# Patient Record
Sex: FEMALE | Race: WHITE | HISPANIC OR LATINO | Employment: UNEMPLOYED | URBAN - METROPOLITAN AREA
[De-identification: names, ages, dates, MRNs, and addresses within clinical notes are randomized per-mention and may not be internally consistent; named-entity substitution may affect disease eponyms.]

---

## 2018-05-16 ENCOUNTER — OFFICE VISIT (OUTPATIENT)
Dept: ORTHOPEDIC SURGERY | Facility: CLINIC | Age: 33
End: 2018-05-16

## 2018-05-16 VITALS
WEIGHT: 139.99 LBS | SYSTOLIC BLOOD PRESSURE: 129 MMHG | DIASTOLIC BLOOD PRESSURE: 87 MMHG | HEIGHT: 64 IN | BODY MASS INDEX: 23.9 KG/M2 | HEART RATE: 81 BPM

## 2018-05-16 DIAGNOSIS — R52 PAIN: Primary | ICD-10-CM

## 2018-05-16 DIAGNOSIS — M21.6X2 ACQUIRED METATARSUS ADDUCTUS OF LEFT FOOT: ICD-10-CM

## 2018-05-16 PROCEDURE — 99406 BEHAV CHNG SMOKING 3-10 MIN: CPT | Performed by: ORTHOPAEDIC SURGERY

## 2018-05-16 PROCEDURE — 99204 OFFICE O/P NEW MOD 45 MIN: CPT | Performed by: ORTHOPAEDIC SURGERY

## 2018-05-16 NOTE — PROGRESS NOTES
NEW PATIENT    Patient: Isabel Phillips  : 1985    Primary Care Provider: MARCELO Macdonald    Requesting Provider: As above    Pain of the Left Foot      History    Chief Complaint: left bunion pain    History of Present Illness: this is a very pleasant 32 year old woman with a greater than 1 year history of painful left bunion.  She reports it hurts in all shoes, is worse with increased activity, throbs at night with pressure.  She rates it as 5/10 all the time.  She has tried wider shoes, etc.  She is interested in surgery.  She does not have a family history of bunions.  She used to wear heels but has stopped due to pain. She reports it hurts in any shoe. No history of ligamentous laxity.  She smoked a few cigarettes a month- I advised her that she will have to quit completely to have foot surgery.  I explained the risks of smoking, including hardening of the arteries, gangrene, amputation.  I explained smoking poisons bone cells and increases the risk of nonunion of fractures and fusions.  I explained that studies show that smokers have FOUR times the risk of the general population when they have foot or ankle surgery.  (5min)      No current outpatient prescriptions on file prior to visit.     No current facility-administered medications on file prior to visit.       No Known Allergies   History reviewed. No pertinent past medical history.  No past surgical history on file.  Family History   Problem Relation Age of Onset   • Stroke Mother    • Arthritis Father       Social History     Social History   • Marital status:      Spouse name: N/A   • Number of children: N/A   • Years of education: N/A     Occupational History   • Not on file.     Social History Main Topics   • Smoking status: Former Smoker     Types: Cigarettes   • Smokeless tobacco: Never Used   • Alcohol use 0.6 oz/week     1 Glasses of wine per week   • Drug use: No   • Sexual activity: Defer     Other Topics Concern   •  "Not on file     Social History Narrative   • No narrative on file        Review of Systems   Constitutional: Negative.    HENT: Negative.    Eyes: Negative.    Respiratory: Negative.    Cardiovascular: Negative.    Gastrointestinal: Negative.    Endocrine: Negative.    Genitourinary: Negative.    Musculoskeletal: Positive for arthralgias.   Skin: Negative.    Allergic/Immunologic: Negative.    Neurological: Negative.    Hematological: Negative.    Psychiatric/Behavioral: Negative.        The following portions of the patient's history were reviewed and updated as appropriate: allergies, current medications, past family history, past medical history, past social history, past surgical history and problem list.    Physical Exam:   /87   Pulse 81   Ht 163.5 cm (64.37\")   Wt 63.5 kg (139 lb 15.9 oz)   BMI 23.75 kg/m²   GENERAL: Body habitus: normal weight for height    Lower extremity edema: Left: none; Right: none    Varicose veins:  Left: none; Right: none    Gait: normal     Mental Status:  awake and alert; oriented to person, place, and time    Voice:  clear  SKIN:  Normal and warm and dry    Hair Growth:  Right:normal; Left:  normal  NAILS: Toenails: normal  HEENT: Head: Normocephalic, atraumatic,  without obvious abnormality.  eye: normal external eye, no icterus  ears: normal external ears  nose: normal external nose  pharynx: dental hygiene adequate  PULM:  Repiratory effort normal  CV:  Dorsalis Pedis:  Right: 2+; Left:2+    Posterior Tibial: Right:2+; Left:2+    Capillary Refill:  Brisk  MSK:  Hand:right handed and sensation intact      Tibia:  Right:  non tender; Left:  non tender      Ankle:  Right: non tender, ROM  normal and symmetric and motor function  normal; Left:  non tender, ROM  normal and symmetric and motor function  normal      Foot:  Right:  no tendereness, only slight bunion, mild HV/MTPV; Left:  tender over the bunion, moderate/severe HV/MTPV, no pain with grind test, no laxity, " moderate HVIP, great toe rubs 2nd toe      NEURO: Heel Walking:  Right:  normal; Left:  normal    Toe Walking:  Right:  normal; Left:  normal     Leonia-Emily 5.07 monofilament test: normal    Lower extremity sensation: intact     Reflexes:  Biceps:  Right:  1+; Left:  1+           Quads:  Right:  2+; Left:  2+           Ankle:  Right:  1+; Left:  1+      Calf Atrophy:none    Motor Function: all 5/5         Medical Decision Making    Data Review:   ordered and reviewed x-rays today    Assessment and Plan/ Diagnosis/Treatment options:   1.Acquired metatarsus adductus of left foot  She has a painful left buion, with a fairly high IM angle.  (16+ deg)  Hallux valgus, metatarsus primus varus and painful bunion.  I explained the mechanical nature of the problem.  I explained that the bunion is not a growth on the foot but rather it is a widening of the angle between the first and second metatarsals.  I drew a picture on the foot and explained the problem in detail.  I explained that bunions are a result of heredity and shoe wear.  The reason they hurt is from shoes pushing on the bunion.      I explained that first line treatment is conservative.  Wide, round toed shoes  can help them be comfortable.  Sometimes custom orthotics can help if they also have metatarsalgia.  I explained that there is no brace that works to change the bunion angle.      If conservative treatment does not help the pain enough then the patient might consider surgery. Pain is the only indication for surgery.   I advised them that I never talk anyone into bunion surgery, only the patient can decide when or if the pain is enough to undergo surgery.      Bunion surgery helps pain.  The [employer] did a reasearch study a number of years ago and it showed that at ONE YEAR after surgery, 90% of patients were happy with the pain relief and the results.     It does not change the appearance of the foot very much, and  it does not change shoe size.  The toe is permanently somewhat stiffer after surgery.  The surgery has a long recovery time, and all foot and ankle surgery swells longer than any other type of surgery.  The reason the swelling is so prolonged is gravity- the foot is below the heart and thus it swells.  The swelling can last for months, sometimes a year.  Swelling will be more prolonged in a patient who has a condition like venous stasis or chronic edema.    She would like to proceed with surgery.  The patient has a  painful bunion with an intermetatarsal angle of greater than 15 degrees and moderate hallux valgus interphalangeus.  Pain is the only reason for the surgery.   Only the patient can decide when or if the pain is enough to undergo surgery.  They have done good conservative treatment, and feel that there is enough pain and limitation to undergo surgery.      Bunion surgery generally helps pain.  The [employer] did a research study a number of years ago and it showed that at ONE YEAR after surgery, 90% of patients were happy with the pain relief and the results.     Bunion surgery does not change the appearance of the foot very much, and it does not change shoe size.  The toe is permanently somewhat stiffer after surgery.  The surgery has a long recovery time, and all foot and ankle surgery swells longer than any other type of surgery.  The reason the swelling is so prolonged is gravity- the foot is below the heart and thus it swells.  The swelling can last for months, sometimes a year.     For bunions with an angle greater than 15 degrees, we cut the bones in two places.  The first bone cut is in the metatarsal and this is called a crescentic osteotomy.  The bone is cut, moved over toward the second metatarsal and held with two screws. The screws are generally permanent, only rarely do they cause   The second bone cut is in the bone of the big toe- a small wedge is removed  to straighten the toe, and it is held together internally with a wire loop.  This is the Akin osteotomy, the small wire loop is permanent.      Post op regimen: the first 6-8 weeks non weight bearing in a tall boot. No driving if it is the right foot.  Second 6-8 weeks walking in the tall boot.  After 12-16 weeks they generally can wear a wide sandal, or wide shoe.  It really takes a year to see the end results.  We discussed the risks including but not limited to death, infection, neurovascular damage, strokes, heart attacks, blood clots, chronic pain, deformity, malunion, nonunion, hardware failure, further surgery, hallux varus, stiffness,  amputation, etc.  Questions asked and answered in detail.

## 2018-05-29 ENCOUNTER — APPOINTMENT (OUTPATIENT)
Dept: PREADMISSION TESTING | Facility: HOSPITAL | Age: 33
End: 2018-05-29

## 2018-05-29 LAB
ANION GAP SERPL CALCULATED.3IONS-SCNC: 4 MMOL/L (ref 3–11)
BUN BLD-MCNC: 12 MG/DL (ref 9–23)
BUN/CREAT SERPL: 20 (ref 7–25)
CALCIUM SPEC-SCNC: 9.6 MG/DL (ref 8.7–10.4)
CHLORIDE SERPL-SCNC: 104 MMOL/L (ref 99–109)
CO2 SERPL-SCNC: 32 MMOL/L (ref 20–31)
CREAT BLD-MCNC: 0.6 MG/DL (ref 0.6–1.3)
DEPRECATED RDW RBC AUTO: 42.5 FL (ref 37–54)
ERYTHROCYTE [DISTWIDTH] IN BLOOD BY AUTOMATED COUNT: 13.6 % (ref 11.3–14.5)
GFR SERPL CREATININE-BSD FRML MDRD: 114 ML/MIN/1.73
GFR SERPL CREATININE-BSD FRML MDRD: 139 ML/MIN/1.73
GLUCOSE BLD-MCNC: 82 MG/DL (ref 70–100)
HBA1C MFR BLD: 5.7 % (ref 4.8–5.6)
HCT VFR BLD AUTO: 39.2 % (ref 34.5–44)
HGB BLD-MCNC: 13.2 G/DL (ref 11.5–15.5)
MCH RBC QN AUTO: 28.9 PG (ref 27–31)
MCHC RBC AUTO-ENTMCNC: 33.7 G/DL (ref 32–36)
MCV RBC AUTO: 86 FL (ref 80–99)
PLATELET # BLD AUTO: 259 10*3/MM3 (ref 150–450)
PMV BLD AUTO: 9.9 FL (ref 6–12)
POTASSIUM BLD-SCNC: 4.1 MMOL/L (ref 3.5–5.5)
RBC # BLD AUTO: 4.56 10*6/MM3 (ref 3.89–5.14)
SODIUM BLD-SCNC: 140 MMOL/L (ref 132–146)
WBC NRBC COR # BLD: 5.23 10*3/MM3 (ref 3.5–10.8)

## 2018-05-29 PROCEDURE — 36415 COLL VENOUS BLD VENIPUNCTURE: CPT

## 2018-05-29 PROCEDURE — 85027 COMPLETE CBC AUTOMATED: CPT | Performed by: ORTHOPAEDIC SURGERY

## 2018-05-29 PROCEDURE — 80048 BASIC METABOLIC PNL TOTAL CA: CPT | Performed by: ORTHOPAEDIC SURGERY

## 2018-05-29 PROCEDURE — 83036 HEMOGLOBIN GLYCOSYLATED A1C: CPT | Performed by: ORTHOPAEDIC SURGERY

## 2018-06-06 ENCOUNTER — TELEPHONE (OUTPATIENT)
Dept: ORTHOPEDIC SURGERY | Facility: CLINIC | Age: 33
End: 2018-06-06

## 2018-06-06 RX ORDER — ONDANSETRON 4 MG/1
4 TABLET, FILM COATED ORAL EVERY 6 HOURS PRN
Qty: 30 TABLET | Refills: 0 | Status: ON HOLD | OUTPATIENT
Start: 2018-06-06 | End: 2018-06-26

## 2018-06-06 RX ORDER — OXYCODONE HYDROCHLORIDE AND ACETAMINOPHEN 5; 325 MG/1; MG/1
1-2 TABLET ORAL EVERY 6 HOURS PRN
Qty: 60 TABLET | Refills: 0 | Status: ON HOLD | OUTPATIENT
Start: 2018-06-06 | End: 2018-06-26

## 2018-06-06 RX ORDER — HYDROCODONE BITARTRATE AND ACETAMINOPHEN 7.5; 325 MG/1; MG/1
1-2 TABLET ORAL EVERY 6 HOURS PRN
Qty: 60 TABLET | Refills: 0 | Status: ON HOLD | OUTPATIENT
Start: 2018-06-06 | End: 2018-06-26

## 2018-06-06 NOTE — TELEPHONE ENCOUNTER
CALLED PATIENT TO ADVISE OF ARRIVAL TIME OF 7:00AM FOR SURGERY ON 6/7 WITH DR CABRERA, NO ANSWER, LEFT VOICEMAIL.  ASKED PATIENT TO RETURN CALL CONFIRMING RECEIPT OF MESSAGE.

## 2018-06-07 ENCOUNTER — OUTSIDE FACILITY SERVICE (OUTPATIENT)
Dept: ORTHOPEDIC SURGERY | Facility: CLINIC | Age: 33
End: 2018-06-07

## 2018-06-07 PROCEDURE — 28295 COR HLX VLGS PRX MTAR OSTEOT: CPT | Performed by: ORTHOPAEDIC SURGERY

## 2018-06-07 PROCEDURE — 28295 COR HLX VLGS PRX MTAR OSTEOT: CPT | Performed by: PHYSICIAN ASSISTANT

## 2018-06-08 ENCOUNTER — TELEPHONE (OUTPATIENT)
Dept: ORTHOPEDIC SURGERY | Facility: CLINIC | Age: 33
End: 2018-06-08

## 2018-06-08 NOTE — TELEPHONE ENCOUNTER
He had some questions about her post op care.  Elevating, aspirin and swelling - I went over all the questions and told him to call me back if has any further ones. Britt

## 2018-06-12 ENCOUNTER — TELEPHONE (OUTPATIENT)
Dept: ORTHOPEDIC SURGERY | Facility: CLINIC | Age: 33
End: 2018-06-12

## 2018-06-12 NOTE — TELEPHONE ENCOUNTER
I called and spoke to her son Damian, I told him to have her elevate the foot as high as she can to get the swelling down and to make sure she takes her pain medication regularly today to get the pain under control.  He understood and will call with any other questions or concerns.  Diya

## 2018-06-12 NOTE — TELEPHONE ENCOUNTER
PTS SON, DAVID, CALLED ABOUT HER POST OP FOOT. HE SAID HER FOOT WAS NOT ELEVATED LAST NIGHT DUE TO IT FALLING OFF THE PILLOW AND SHE IS IN A LOT OF PAIN THIS MORNING. HE WOULD LIKE A CALL BACK REGARDING THIS. THE CALL BACK NUMBER -[phone].

## 2018-06-13 ENCOUNTER — OFFICE VISIT (OUTPATIENT)
Dept: ORTHOPEDIC SURGERY | Facility: CLINIC | Age: 33
End: 2018-06-13

## 2018-06-13 DIAGNOSIS — Z47.89 AFTERCARE FOLLOWING SURGERY OF THE MUSCULOSKELETAL SYSTEM: Primary | ICD-10-CM

## 2018-06-13 PROCEDURE — 29550 STRAPPING OF TOES: CPT | Performed by: ORTHOPAEDIC SURGERY

## 2018-06-13 PROCEDURE — 99024 POSTOP FOLLOW-UP VISIT: CPT | Performed by: ORTHOPAEDIC SURGERY

## 2018-06-13 NOTE — PROGRESS NOTES
Post-op (1 week status post -- left crescentic/aking 06/07/18)      Mayela D ArreolaReyes is 1 week status post left crescentic/ Shaji, 6/7/18. She reports no fever, chills.  She reports pain is well controlled.  They have been taking aspirin for DVT prophylaxis.  They have been NWB in boot.      No past surgical history on file.    There were no vitals taken for this visit.        No erythema, no drainage, no sign of infection, normal post op swelling.  Left foot has good alignment, incisions healing well    ordered and reviewed x-rays today    Assessment and Plan:   1. Aftercare following surgery of the musculoskeletal system  Doing well although she's had a lot of nausea.  I showed her how to work on great toe range of motion.  I put her back in a bunion strapping dressing.  She must remain nonweightbearing.  I will see her in a week for 2 views of the foot x-ray, at that visit please taken x-ray with the foot straight up and down rather than inverted if necessary do a simulated weightbearing AP- ask before you do the xray if needed    Using gauze, cling and ace wrap I placed a bunion strapping dressing to put the toe in appropriate alignment

## 2018-06-22 ENCOUNTER — OFFICE VISIT (OUTPATIENT)
Dept: ORTHOPEDIC SURGERY | Facility: CLINIC | Age: 33
End: 2018-06-22

## 2018-06-22 DIAGNOSIS — Z09 SURGERY FOLLOW-UP: Primary | ICD-10-CM

## 2018-06-22 PROCEDURE — 99024 POSTOP FOLLOW-UP VISIT: CPT | Performed by: ORTHOPAEDIC SURGERY

## 2018-06-22 NOTE — PROGRESS NOTES
Post-op (1 week f/u; 2 weeks status post Left Crescentic/Aking 06/07/18)      Mayela D ArreolaReyes is 2 weeks status post left crescentic osteotomy, 6/7/18. She reports no fever, chills.  She reports pain is well controlled.  They have been taking aspirin for DVT prophylaxis.  They have been NWB in boot.      No past surgical history on file.    There were no vitals taken for this visit.        No erythema, no drainage, no sign of infection, normal post op swelling.  Left foot clinically is well aligned    ordered and reviewed x-rays today    Assessment and Plan:   1. Surgery follow-up  Radiographs today show the osteotomy has not remained in place.  It has angled into overcorrection.  This is not where was in the operating room.  I was somewhat suspicious of the x-ray last week, but he was taken at an oblique angle, now that we got a straight on x-ray today the osteotomy is not in the same alignment it was.  We need to redo this.  I explained this to the patient, her son and her .  I cannot leave such a young person with this type of alignment.  I explained I might need to put more hardware in it.  We will do this on Tuesday.  I explained this is a rare problem, but it does sometimes happen.  Sometimes the screws just do not hold the bone in position.  We discussed the risks including but not limited to: death, infection, neurovascular damage, strokes, heart attacks, blood clots, chronic pain, deformity, stiffness, malunion, nonunion, hardware failure, need for further surgery,  amputation, etc.  Questions were asked and answered in detail.     - XR Foot 3+ View Left

## 2018-06-25 RX ORDER — ONDANSETRON 4 MG/1
4 TABLET, FILM COATED ORAL EVERY 6 HOURS PRN
Qty: 30 TABLET | Refills: 0 | Status: SHIPPED | OUTPATIENT
Start: 2018-06-25 | End: 2018-07-20

## 2018-06-25 RX ORDER — HYDROCODONE BITARTRATE AND ACETAMINOPHEN 7.5; 325 MG/1; MG/1
1-2 TABLET ORAL EVERY 6 HOURS PRN
Qty: 60 TABLET | Refills: 0 | Status: SHIPPED | OUTPATIENT
Start: 2018-06-25 | End: 2018-07-20

## 2018-06-26 ENCOUNTER — HOSPITAL ENCOUNTER (OUTPATIENT)
Facility: HOSPITAL | Age: 33
Setting detail: HOSPITAL OUTPATIENT SURGERY
Discharge: HOME | End: 2018-06-26
Attending: ORTHOPAEDIC SURGERY | Admitting: ORTHOPAEDIC SURGERY

## 2018-06-26 ENCOUNTER — ANESTHESIA EVENT (OUTPATIENT)
Dept: PERIOP | Facility: HOSPITAL | Age: 33
End: 2018-06-26

## 2018-06-26 ENCOUNTER — ANESTHESIA (OUTPATIENT)
Dept: PERIOP | Facility: HOSPITAL | Age: 33
End: 2018-06-26

## 2018-06-26 ENCOUNTER — APPOINTMENT (OUTPATIENT)
Dept: GENERAL RADIOLOGY | Facility: HOSPITAL | Age: 33
End: 2018-06-26

## 2018-06-26 VITALS
WEIGHT: 148 LBS | DIASTOLIC BLOOD PRESSURE: 84 MMHG | BODY MASS INDEX: 25.27 KG/M2 | SYSTOLIC BLOOD PRESSURE: 129 MMHG | HEIGHT: 64 IN | RESPIRATION RATE: 16 BRPM | TEMPERATURE: 97.7 F | OXYGEN SATURATION: 97 % | HEART RATE: 67 BPM

## 2018-06-26 DIAGNOSIS — Z09 SURGERY FOLLOW-UP: ICD-10-CM

## 2018-06-26 LAB
B-HCG UR QL: NEGATIVE
INTERNAL NEGATIVE CONTROL: NORMAL
INTERNAL POSITIVE CONTROL: REACTIVE
Lab: NORMAL

## 2018-06-26 PROCEDURE — 76000 FLUOROSCOPY <1 HR PHYS/QHP: CPT

## 2018-06-26 PROCEDURE — 25010000003 CEFAZOLIN IN DEXTROSE 2-4 GM/100ML-% SOLUTION: Performed by: ORTHOPAEDIC SURGERY

## 2018-06-26 PROCEDURE — C1713 ANCHOR/SCREW BN/BN,TIS/BN: HCPCS | Performed by: ORTHOPAEDIC SURGERY

## 2018-06-26 PROCEDURE — 25010000002 FENTANYL CITRATE (PF) 100 MCG/2ML SOLUTION: Performed by: NURSE ANESTHETIST, CERTIFIED REGISTERED

## 2018-06-26 PROCEDURE — 25010000002 MIDAZOLAM PER 1 MG: Performed by: NURSE ANESTHETIST, CERTIFIED REGISTERED

## 2018-06-26 PROCEDURE — 25010000002 DEXAMETHASONE PER 1 MG: Performed by: NURSE ANESTHETIST, CERTIFIED REGISTERED

## 2018-06-26 PROCEDURE — C1769 GUIDE WIRE: HCPCS | Performed by: ORTHOPAEDIC SURGERY

## 2018-06-26 PROCEDURE — 28322 REPAIR OF METATARSALS: CPT | Performed by: ORTHOPAEDIC SURGERY

## 2018-06-26 PROCEDURE — 25010000002 PROPOFOL 10 MG/ML EMULSION: Performed by: NURSE ANESTHETIST, CERTIFIED REGISTERED

## 2018-06-26 PROCEDURE — 25010000002 ONDANSETRON PER 1 MG: Performed by: NURSE ANESTHETIST, CERTIFIED REGISTERED

## 2018-06-26 PROCEDURE — 25010000002 ROPIVACAINE PER 1 MG: Performed by: ORTHOPAEDIC SURGERY

## 2018-06-26 DEVICE — SCRW CANN SHT THRD 1/3 4X28MM: Type: IMPLANTABLE DEVICE | Site: FOOT | Status: FUNCTIONAL

## 2018-06-26 RX ORDER — LIDOCAINE HYDROCHLORIDE 10 MG/ML
INJECTION, SOLUTION EPIDURAL; INFILTRATION; INTRACAUDAL; PERINEURAL AS NEEDED
Status: DISCONTINUED | OUTPATIENT
Start: 2018-06-26 | End: 2018-06-26 | Stop reason: SURG

## 2018-06-26 RX ORDER — SODIUM CHLORIDE 0.9 % (FLUSH) 0.9 %
1-10 SYRINGE (ML) INJECTION AS NEEDED
Status: DISCONTINUED | OUTPATIENT
Start: 2018-06-26 | End: 2018-06-26 | Stop reason: HOSPADM

## 2018-06-26 RX ORDER — PROMETHAZINE HYDROCHLORIDE 25 MG/ML
6.25 INJECTION, SOLUTION INTRAMUSCULAR; INTRAVENOUS ONCE AS NEEDED
Status: DISCONTINUED | OUTPATIENT
Start: 2018-06-26 | End: 2018-06-26 | Stop reason: HOSPADM

## 2018-06-26 RX ORDER — DEXAMETHASONE SODIUM PHOSPHATE 4 MG/ML
INJECTION, SOLUTION INTRA-ARTICULAR; INTRALESIONAL; INTRAMUSCULAR; INTRAVENOUS; SOFT TISSUE AS NEEDED
Status: DISCONTINUED | OUTPATIENT
Start: 2018-06-26 | End: 2018-06-26 | Stop reason: SURG

## 2018-06-26 RX ORDER — PROMETHAZINE HYDROCHLORIDE 25 MG/1
25 SUPPOSITORY RECTAL ONCE AS NEEDED
Status: DISCONTINUED | OUTPATIENT
Start: 2018-06-26 | End: 2018-06-26 | Stop reason: HOSPADM

## 2018-06-26 RX ORDER — PROPOFOL 10 MG/ML
VIAL (ML) INTRAVENOUS AS NEEDED
Status: DISCONTINUED | OUTPATIENT
Start: 2018-06-26 | End: 2018-06-26 | Stop reason: SURG

## 2018-06-26 RX ORDER — PROMETHAZINE HYDROCHLORIDE 25 MG/1
25 TABLET ORAL ONCE AS NEEDED
Status: DISCONTINUED | OUTPATIENT
Start: 2018-06-26 | End: 2018-06-26 | Stop reason: HOSPADM

## 2018-06-26 RX ORDER — ONDANSETRON 2 MG/ML
INJECTION INTRAMUSCULAR; INTRAVENOUS AS NEEDED
Status: DISCONTINUED | OUTPATIENT
Start: 2018-06-26 | End: 2018-06-26 | Stop reason: SURG

## 2018-06-26 RX ORDER — MIDAZOLAM HYDROCHLORIDE 1 MG/ML
INJECTION INTRAMUSCULAR; INTRAVENOUS AS NEEDED
Status: DISCONTINUED | OUTPATIENT
Start: 2018-06-26 | End: 2018-06-26 | Stop reason: SURG

## 2018-06-26 RX ORDER — FAMOTIDINE 20 MG/1
20 TABLET, FILM COATED ORAL
Status: DISCONTINUED | OUTPATIENT
Start: 2018-06-26 | End: 2018-06-26 | Stop reason: HOSPADM

## 2018-06-26 RX ORDER — SODIUM CHLORIDE, SODIUM LACTATE, POTASSIUM CHLORIDE, CALCIUM CHLORIDE 600; 310; 30; 20 MG/100ML; MG/100ML; MG/100ML; MG/100ML
9 INJECTION, SOLUTION INTRAVENOUS CONTINUOUS PRN
Status: DISCONTINUED | OUTPATIENT
Start: 2018-06-26 | End: 2018-06-26 | Stop reason: HOSPADM

## 2018-06-26 RX ORDER — FENTANYL CITRATE 50 UG/ML
50 INJECTION, SOLUTION INTRAMUSCULAR; INTRAVENOUS
Status: DISCONTINUED | OUTPATIENT
Start: 2018-06-26 | End: 2018-06-26 | Stop reason: HOSPADM

## 2018-06-26 RX ORDER — FENTANYL CITRATE 50 UG/ML
INJECTION, SOLUTION INTRAMUSCULAR; INTRAVENOUS AS NEEDED
Status: DISCONTINUED | OUTPATIENT
Start: 2018-06-26 | End: 2018-06-26 | Stop reason: SURG

## 2018-06-26 RX ORDER — LIDOCAINE HYDROCHLORIDE 10 MG/ML
0.5 INJECTION, SOLUTION EPIDURAL; INFILTRATION; INTRACAUDAL; PERINEURAL ONCE AS NEEDED
Status: COMPLETED | OUTPATIENT
Start: 2018-06-26 | End: 2018-06-26

## 2018-06-26 RX ORDER — CEFAZOLIN SODIUM 2 G/100ML
2 INJECTION, SOLUTION INTRAVENOUS ONCE
Status: COMPLETED | OUTPATIENT
Start: 2018-06-26 | End: 2018-06-26

## 2018-06-26 RX ADMIN — LIDOCAINE HYDROCHLORIDE 0.5 ML: 10 INJECTION, SOLUTION EPIDURAL; INFILTRATION; INTRACAUDAL; PERINEURAL at 12:03

## 2018-06-26 RX ADMIN — MIDAZOLAM HYDROCHLORIDE 2 MG: 1 INJECTION, SOLUTION INTRAMUSCULAR; INTRAVENOUS at 12:59

## 2018-06-26 RX ADMIN — LIDOCAINE HYDROCHLORIDE 50 MG: 10 INJECTION, SOLUTION EPIDURAL; INFILTRATION; INTRACAUDAL; PERINEURAL at 13:05

## 2018-06-26 RX ADMIN — CEFAZOLIN SODIUM 2 G: 2 INJECTION, SOLUTION INTRAVENOUS at 12:59

## 2018-06-26 RX ADMIN — PROPOFOL 200 MG: 10 INJECTION, EMULSION INTRAVENOUS at 13:05

## 2018-06-26 RX ADMIN — SODIUM CHLORIDE, POTASSIUM CHLORIDE, SODIUM LACTATE AND CALCIUM CHLORIDE 9 ML/HR: 600; 310; 30; 20 INJECTION, SOLUTION INTRAVENOUS at 12:03

## 2018-06-26 RX ADMIN — FAMOTIDINE 20 MG: 20 TABLET ORAL at 12:07

## 2018-06-26 RX ADMIN — ONDANSETRON 4 MG: 2 INJECTION INTRAMUSCULAR; INTRAVENOUS at 14:20

## 2018-06-26 RX ADMIN — FENTANYL CITRATE 100 MCG: 50 INJECTION, SOLUTION INTRAMUSCULAR; INTRAVENOUS at 13:05

## 2018-06-26 RX ADMIN — DEXAMETHASONE SODIUM PHOSPHATE 8 MG: 4 INJECTION, SOLUTION INTRAMUSCULAR; INTRAVENOUS at 13:15

## 2018-06-26 RX ADMIN — SODIUM CHLORIDE, POTASSIUM CHLORIDE, SODIUM LACTATE AND CALCIUM CHLORIDE: 600; 310; 30; 20 INJECTION, SOLUTION INTRAVENOUS at 12:58

## 2018-06-26 RX ADMIN — SODIUM CHLORIDE, POTASSIUM CHLORIDE, SODIUM LACTATE AND CALCIUM CHLORIDE: 600; 310; 30; 20 INJECTION, SOLUTION INTRAVENOUS at 14:07

## 2018-06-26 RX ADMIN — FENTANYL CITRATE 50 MCG: 50 INJECTION, SOLUTION INTRAMUSCULAR; INTRAVENOUS at 15:14

## 2018-06-26 RX ADMIN — EPHEDRINE SULFATE 5 MG: 50 INJECTION INTRAMUSCULAR; INTRAVENOUS; SUBCUTANEOUS at 13:58

## 2018-06-26 NOTE — ANESTHESIA PROCEDURE NOTES
Airway  Urgency: elective    Airway not difficult    General Information and Staff    Patient location during procedure: OR  CRNA: KG ASCENCIO    Indications and Patient Condition  Indications for airway management: airway protection    Preoxygenated: yes  Mask difficulty assessment: 1 - vent by mask    Final Airway Details  Final airway type: supraglottic airway      Successful airway: I-gel  Size 4    Number of attempts at approach: 1    Additional Comments  LMA placed without difficulty, ventilation with assist, equal breath sounds and symmetric chest rise and fall

## 2018-06-26 NOTE — ANESTHESIA POSTPROCEDURE EVALUATION
Patient: Mayela D ArreolaReyes    Procedure Summary     Date:  06/26/18 Room / Location:   [facility]  [facility]    Anesthesia Start:  1259 Anesthesia Stop:      Procedure:  REVISION OF OSTEOTOMY LEFT FOOT (Left Foot) Diagnosis:       Surgery follow-up      (Surgery follow-up [Z09])    Surgeon:  Albina Banuelos MD Provider:  Damien Florian MD    Anesthesia Type:  general ASA Status:  1          Anesthesia Type: general  Last vitals  BP   113/72   Temp   97.1   Pulse   77   Resp    12   SpO2   100%     Post Anesthesia Care and Evaluation    Patient location during evaluation: PACU  Patient participation: complete - patient participated  Level of consciousness: awake and alert  Pain score: 0  Pain management: adequate  Airway patency: patent  Anesthetic complications: No anesthetic complications  PONV Status: none  Cardiovascular status: hemodynamically stable and acceptable  Respiratory status: nonlabored ventilation, acceptable and nasal cannula  Hydration status: acceptable

## 2018-06-26 NOTE — ANESTHESIA PREPROCEDURE EVALUATION
Anesthesia Evaluation     Patient summary reviewed and Nursing notes reviewed   NPO Solid Status: > 8 hours  NPO Liquid Status: > 8 hours           Airway   Mallampati: I  TM distance: >3 FB  Neck ROM: full  No difficulty expected  Dental      Pulmonary - normal exam   Cardiovascular   Exercise tolerance: good (4-7 METS)    Rhythm: regular  Rate: normal        Neuro/Psych  GI/Hepatic/Renal/Endo      Musculoskeletal     Abdominal    Substance History      OB/GYN          Other                        Anesthesia Plan    ASA 1     general     intravenous induction   Anesthetic plan and risks discussed with patient.

## 2018-07-02 ENCOUNTER — OFFICE VISIT (OUTPATIENT)
Dept: ORTHOPEDIC SURGERY | Facility: CLINIC | Age: 33
End: 2018-07-02

## 2018-07-02 DIAGNOSIS — Z47.89 AFTERCARE FOLLOWING SURGERY OF THE MUSCULOSKELETAL SYSTEM: Primary | ICD-10-CM

## 2018-07-02 PROCEDURE — 99024 POSTOP FOLLOW-UP VISIT: CPT | Performed by: ORTHOPAEDIC SURGERY

## 2018-07-02 NOTE — PROGRESS NOTES
Post-op (1 week s/p REVISION OF OSTEOTOMY LEFT FOOT 6/26/18)      Mayela D ArreolaReyes is 1 week status post revision left crescentic, 6/26/18. She reports no fever, chills.  She reports pain is well controlled.  They have been taking aspirin for DVT prophylaxis.  They have been NWB in boot.      Past Surgical History:   Procedure Laterality Date   • FOOT OSTEOTOMY     • FOOT OSTEOTOMY Left 6/26/2018    Procedure: REVISION OF OSTEOTOMY LEFT FOOT;  Surgeon: Albina Banuelos MD;  [facility];  Service: Orthopedics       [facility] 06/15/2018         No erythema, no drainage, no sign of infection, normal post op swelling. Good alignment left foot, incisions healing well    ordered and reviewed x-rays today    Assessment and Plan:   1. Aftercare following surgery of the musculoskeletal system  The osteotomy is in much better alignment.  I replaced a bunion strapping dressing, pulling great toe laterally to avoid varus.  Continue non-wt bearing, return to see Ms Sifuentes on 7/10/18 for xray, and possible suture removal. She should be replaced in buion strapping dressing and see me for another xray in 7-10 days.      Using gauze, cling and ace wrap I placed a bunion strapping dressing to put the toe in appropriate alignment    - XR Foot 2 View Left

## 2018-07-10 ENCOUNTER — OFFICE VISIT (OUTPATIENT)
Dept: ORTHOPEDIC SURGERY | Facility: CLINIC | Age: 33
End: 2018-07-10

## 2018-07-10 DIAGNOSIS — Z47.89 AFTERCARE FOLLOWING SURGERY OF THE MUSCULOSKELETAL SYSTEM: Primary | ICD-10-CM

## 2018-07-10 PROCEDURE — 99024 POSTOP FOLLOW-UP VISIT: CPT | Performed by: PHYSICIAN ASSISTANT

## 2018-07-10 NOTE — PROGRESS NOTES
[facility] Orthopaedic Surgery Clinic Note    Subjective     Patient: Mayela D ArreolaReyes  : 1985    Primary Care Provider: MARCELO Macdonald    Requesting Provider: As above    Post-op (1 week f/u; 2 weeks status post Revision of Osteotomy Left Foot 18)      History    History of Present Illness: Patient returns status post revision left crescentic Shaji osteotomy 18.  She reports improved pain.  She reports no new symptoms.  She has been nonweightbearing as instructed.    Current Outpatient Prescriptions on File Prior to Visit   Medication Sig Dispense Refill   • HYDROcodone-acetaminophen (NORCO) 7.5-325 MG per tablet Take 1-2 tablets by mouth Every 6 (Six) Hours As Needed for Moderate Pain  (as needed for post surgical pain). 60 tablet 0   • ondansetron (ZOFRAN) 4 MG tablet Take 1 tablet by mouth Every 6 (Six) Hours As Needed for Nausea or Vomiting. 30 tablet 0     No current facility-administered medications on file prior to visit.       No Known Allergies   History reviewed. No pertinent past medical history.  Past Surgical History:   Procedure Laterality Date   • FOOT OSTEOTOMY     • FOOT OSTEOTOMY Left 2018    Procedure: REVISION OF OSTEOTOMY LEFT FOOT;  Surgeon: Albina Banuelos MD;  Location: [facility];  Service: Orthopedics     Family History   Problem Relation Age of Onset   • Stroke Mother    • Arthritis Father       Social History     Social History   • Marital status:      Spouse name: N/A   • Number of children: N/A   • Years of education: N/A     Occupational History   • Not on file.     Social History Main Topics   • Smoking status: Former Smoker     Types: Cigarettes   • Smokeless tobacco: Never Used   • Alcohol use 0.6 oz/week     1 Glasses of wine per week   • Drug use: No   • Sexual activity: Defer     Other Topics Concern   • Not on file     Social History Narrative   • No narrative on file        Review of Systems   Constitutional: Negative.    HENT:  Negative.    Eyes: Negative.    Respiratory: Negative.    Cardiovascular: Negative.    Gastrointestinal: Negative.    Endocrine: Negative.    Genitourinary: Negative.    Musculoskeletal: Positive for joint swelling.   Skin: Negative.    Allergic/Immunologic: Negative.    Neurological: Negative.    Hematological: Negative.    Psychiatric/Behavioral: Negative.        The following portions of the patient's history were reviewed and updated as appropriate: allergies, current medications, past family history, past medical history, past social history, past surgical history and problem list.      Objective      Physical Exam  LMP 06/15/2018     There is no height or weight on file to calculate BMI.    Ortho Exam  Left foot exam:  Incisions are healing well  Mild postop swelling as expected  Pulses 2+    Medical Decision Making    Data Review:   ordered and reviewed x-rays today    Assessment:  1. Aftercare following surgery of the musculoskeletal system        Plan:  Doing well status post revision left crescentic osteotomy 6/26/18.  X-rays today show good good alignment of the osteotomy with intact hardware.  Sutures removed today.  Patient was instructed to remain nonweightbearing.  She was redressed with the bunion strapping.  She will return in 7-10 days to see Dr. Kendall with repeat x-rays of the left foot nonweightbearing or sooner if needed.      Lita Sifuentes PA-C  07/10/18  10:11 AM

## 2018-07-16 ENCOUNTER — TELEPHONE (OUTPATIENT)
Dept: ORTHOPEDIC SURGERY | Facility: CLINIC | Age: 33
End: 2018-07-16

## 2018-07-16 NOTE — TELEPHONE ENCOUNTER
PT STATES THAT FOOT HAS BEEN REALLY HOT FOR THE PAST 3-4 DAYS. PT CANNOT TELL IF IT IS RED BECAUSE OF THE BOOT THAT'S ON. PLEASE CALL PT BACK.

## 2018-07-16 NOTE — TELEPHONE ENCOUNTER
I called her and she said it has a burning feeling. She is not running a fever.  It only happens every 2-3 hours and will last 10-15 minutes and then it is gone.  She has not really been elevating high enough.  She stated that the she also gets tingling and feels like there is a lot of pressure in the boot.  I explained she is probably having swelling in the boot. I recommended that she elevate the leg higher than her heart tonight and tomorrow and to see if that helps.  I told her to call me back on Wednesday if this does not help.  Diya

## 2018-07-17 NOTE — TELEPHONE ENCOUNTER
Let her a voicemail to let her know it is ok to not come in if she is better but to call us if she starts having problems again.  Diya

## 2018-07-17 NOTE — TELEPHONE ENCOUNTER
I called her and she said she has been doing what I suggested and is much better. Do you want her to still come in anyway??  Diya

## 2018-07-20 ENCOUNTER — OFFICE VISIT (OUTPATIENT)
Dept: ORTHOPEDIC SURGERY | Facility: CLINIC | Age: 33
End: 2018-07-20

## 2018-07-20 DIAGNOSIS — Z47.89 AFTERCARE FOLLOWING SURGERY OF THE MUSCULOSKELETAL SYSTEM: Primary | ICD-10-CM

## 2018-07-20 PROCEDURE — 99024 POSTOP FOLLOW-UP VISIT: CPT | Performed by: ORTHOPAEDIC SURGERY

## 2018-07-20 NOTE — PROGRESS NOTES
Post-op Follow-up (3 weeks status post Revision of Osteotomy Left Foot 6/26/18))      Mayela D ArreolaReyes is 3 weeks status post revision crescentic osteotomy, 6/26/18. She reports no fever, chills.  She reports pain is well controlled.  They have been taking aspirin for DVT prophylaxis.  They have been NWB in boot.      Past Surgical History:   Procedure Laterality Date   • FOOT OSTEOTOMY     • FOOT OSTEOTOMY Left 6/26/2018    Procedure: REVISION OF OSTEOTOMY LEFT FOOT;  Surgeon: Albina Banuelos MD;  Location: [facility];  Service: Orthopedics       There were no vitals taken for this visit.        No erythema, no drainage, no sign of infection, normal post op swelling. Good alignment left foot    ordered and reviewed x-rays today    Assessment and Plan:   1. Aftercare following surgery of the musculoskeletal system  Currently doing well, the osteotomy is staying in position.  No significant varus of the toe.  She may now get it wet.  Continue nonweightbearing.  I want her to work on range of motion of the toe and I showed her how to do this.  I want her to keep the first and second toes lucia taped to prevent varus.  I will see her in 3 weeks for 2 views of the foot to possibly allow her to begin weightbearing  - XR Foot 2 View Left; Future

## 2018-08-10 ENCOUNTER — OFFICE VISIT (OUTPATIENT)
Dept: ORTHOPEDIC SURGERY | Facility: CLINIC | Age: 33
End: 2018-08-10

## 2018-08-10 ENCOUNTER — TELEPHONE (OUTPATIENT)
Dept: ORTHOPEDIC SURGERY | Facility: CLINIC | Age: 33
End: 2018-08-10

## 2018-08-10 DIAGNOSIS — Z09 SURGICAL FOLLOW-UP CARE: Primary | ICD-10-CM

## 2018-08-10 PROCEDURE — 99024 POSTOP FOLLOW-UP VISIT: CPT | Performed by: ORTHOPAEDIC SURGERY

## 2018-08-10 NOTE — PROGRESS NOTES
Post-op (3 weeks - status post Revision of Osteotomy Left Foot 6/26/18)      Mayela D ArreolaReyes is 6 weeks status post revision left crescentic, 6/26/18. She reports no fever, chills.  She reports pain is well controlled.  They have been taking aspirin for DVT prophylaxis.  They have been NWB in boot.      Past Surgical History:   Procedure Laterality Date   • FOOT OSTEOTOMY     • FOOT OSTEOTOMY Left 6/26/2018    Procedure: REVISION OF OSTEOTOMY LEFT FOOT;  Surgeon: Albina Banuelos MD;  Location: [facility];  Service: Orthopedics       There were no vitals taken for this visit.        No erythema, no drainage, no sign of infection, normal post op swelling. Excellent alignment left foot, great toe has 20 deg dorsiflex, minimal plantar    ordered and reviewed x-rays today    Assessment and Plan:   1. Surgical follow-up care  Doing well, toe and osteotomy in good alignment.  She may walk in the boot now, work more on great toe ROM, I showed her how to do this.  I will see her in 4 weeks for xray.  She will be off work until 10/31 at least.   - XR Foot 2 View Left

## 2018-09-07 ENCOUNTER — OFFICE VISIT (OUTPATIENT)
Dept: ORTHOPEDIC SURGERY | Facility: CLINIC | Age: 33
End: 2018-09-07

## 2018-09-07 DIAGNOSIS — Z47.89 AFTERCARE FOLLOWING SURGERY OF THE MUSCULOSKELETAL SYSTEM: Primary | ICD-10-CM

## 2018-09-07 PROCEDURE — 99024 POSTOP FOLLOW-UP VISIT: CPT | Performed by: ORTHOPAEDIC SURGERY

## 2018-09-07 NOTE — PROGRESS NOTES
Post-op Follow-up (4 week f/u; 10 weeks - status post Revision of Osteotomy Left Foot 6/26/18)      Mayela D ArreolaReyes is 10 weeks status post right crescentic osteotomy revision 6/26/18. She reports no fever, chills.  She reports pain is well controlled.  They have been taking off meds now for DVT prophylaxis.  They have been weight bearing in boot.      Past Surgical History:   Procedure Laterality Date   • FOOT OSTEOTOMY     • FOOT OSTEOTOMY Left 6/26/2018    Procedure: REVISION OF OSTEOTOMY LEFT FOOT;  Surgeon: Albina Banuelos MD;  Location: [facility];  Service: Orthopedics       There were no vitals taken for this visit.        No erythema, no drainage, no sign of infection, normal post op swelling. Excellent alignment right foot, great toe has 20 deg dorsiflex and plantar flex, no tenderness    ordered and reviewed x-rays today    Assessment and Plan:   1. Aftercare following surgery of the musculoskeletal system  Doing well, she may go into a wide shoe or sandal, make sure it is not too tight for normal swelling. I will see her in 6 weeks fro xray of foot,   - XR Foot 2 View Left

## 2018-09-25 ENCOUNTER — TELEPHONE (OUTPATIENT)
Dept: ORTHOPEDIC SURGERY | Facility: CLINIC | Age: 33
End: 2018-09-25

## 2018-09-25 NOTE — TELEPHONE ENCOUNTER
PT WOULD LIKE A NOTE STATING WHEN SHE IS ABLE TO RETURN TO WORK. IF THIS IS OKAY, WHEN TYPED SHE WOULD LIKE IT FAXED [phone].

## 2018-10-19 ENCOUNTER — OFFICE VISIT (OUTPATIENT)
Dept: ORTHOPEDIC SURGERY | Facility: CLINIC | Age: 33
End: 2018-10-19

## 2018-10-19 VITALS — OXYGEN SATURATION: 98 % | BODY MASS INDEX: 24.59 KG/M2 | HEIGHT: 64 IN | HEART RATE: 96 BPM | WEIGHT: 144 LBS

## 2018-10-19 DIAGNOSIS — Z47.89 AFTERCARE FOLLOWING SURGERY OF THE MUSCULOSKELETAL SYSTEM: Primary | ICD-10-CM

## 2018-10-19 DIAGNOSIS — M21.6X2 ACQUIRED METATARSUS ADDUCTUS OF LEFT FOOT: ICD-10-CM

## 2018-10-19 PROCEDURE — 99212 OFFICE O/P EST SF 10 MIN: CPT | Performed by: ORTHOPAEDIC SURGERY

## 2018-10-19 NOTE — PROGRESS NOTES
"Post-op (6 week f/u; 16 weeks - status post Revision of Osteotomy Left Foot 6/26/18)      Mayela D ArreolaReyes is 3 months status post left crescentic Akin with revision, 6/26/18. She reports no fever, chills.  She reports pain is resolved.  They have been taking off meds now for DVT prophylaxis.  They have been weight bearing as tolerated.      Past Surgical History:   Procedure Laterality Date   • FOOT OSTEOTOMY     • FOOT OSTEOTOMY Left 6/26/2018    Procedure: REVISION OF OSTEOTOMY LEFT FOOT;  Surgeon: Albina Banuelos MD;  Location: Replaced by [facility];  Service: Orthopedics       Pulse 96   Ht 162.6 cm (64.02\")   Wt 65.3 kg (144 lb)   SpO2 98%   BMI 24.71 kg/m²         No erythema, no drainage, no sign of infection, normal post op swelling.  Still moderately stiff in the great toe, neutral plantarflexion, 30° dorsiflexion, excellent alignment    ordered and reviewed x-rays today    Assessment and Plan:   1. Aftercare following surgery of the musculoskeletal system  She has excellent alignment, after the revision.  Moderately stiff and she is having some trouble walking straight again, I think she would benefit from physical therapy.  She is to stay off work.  I'll see her in 3 months with final standing 2 views of the foot  - XR Foot 2 View Left          "

## 2018-11-07 ENCOUNTER — HOSPITAL ENCOUNTER (OUTPATIENT)
Dept: PHYSICAL THERAPY | Facility: HOSPITAL | Age: 33
Setting detail: THERAPIES SERIES
Discharge: HOME | End: 2018-11-07
Attending: ORTHOPAEDIC SURGERY

## 2018-11-07 DIAGNOSIS — Z98.890 S/P BUNIONECTOMY: Primary | ICD-10-CM

## 2018-11-07 PROCEDURE — 97161 PT EVAL LOW COMPLEX 20 MIN: CPT | Performed by: PHYSICAL THERAPIST

## 2018-11-07 PROCEDURE — 97110 THERAPEUTIC EXERCISES: CPT | Performed by: PHYSICAL THERAPIST

## 2018-11-07 NOTE — THERAPY EVALUATION
"    Outpatient Physical Therapy Ortho Initial Evaluation   [place]     Patient Name: Mayela D ArreolaReyes  : 1985  MRN: [id number]  Today's Date: 2018      Visit Date: 2018    Patient Active Problem List   Diagnosis   • Acquired metatarsus adductus of left foot   • Surgery follow-up        No past medical history on file.     Past Surgical History:   Procedure Laterality Date   • FOOT OSTEOTOMY     • FOOT OSTEOTOMY Left 2018    Procedure: REVISION OF OSTEOTOMY LEFT FOOT;  Surgeon: Albina Banuelos MD;  Location: [facility];  Service: Orthopedics       Visit Dx:     ICD-10-CM ICD-9-CM   1. S/P bunionectomy Z98.890 V45.89             Patient History     Row Name 18 1100             History    Chief Complaint Joint stiffness;Pain  -LS      Type of Pain Foot pain   L foot  -LS      Brief Description of Current Complaint Pt had a bunion removed from her L metatarsal head on  after experiencing severe foot pain for appx 1 month. Pt had a revision on  due to \"movement of a screw\" per pt report. Since surgery, pain has significantly decreased and her function has improved. Her surgeon provided exercises to restore mobility of the foot and ankle and pt was compliant. She had a f/u with her surgeon on  who referred her to PT to improve mobility and function. Pt continues to note inc pain with walking and stated that her foot can swell and bruise after prolonged activity. Pt manages pain with Tylenol and decreased activity level.   -LS      Previous treatment for THIS PROBLEM Surgery  -LS      Current Tobacco Use none   -LS      Smoking Status none   -LS      Patient's Rating of General Health Excellent  -LS      How has patient tried to help current problem? Tylenol   -LS         Pain     Pain Location Toe (Comment which one)   L first toe, bottom of MTP  -LS      Pain at Present 4  -LS      Pain at Best 0  -LS      Pain at Worst 7  -LS      Pain Frequency Intermittent  " -LS      Pain Description Burning  -LS      What Performance Factors Make the Current Problem(s) WORSE? Prolonged walking, prolonged standing  -LS      What Performance Factors Make the Current Problem(s) BETTER? Getting of her feet, rest   -LS      Is your sleep disturbed? No  -LS      Difficulties with ADL's? Prolonged standing to cook and clean, difficulty with stairs   -LS      Difficulties with recreational activities? Recreational exercise  -LS         Fall Risk Assessment    Any falls in the past year: No  -LS         Daily Activities    Primary Language Bengali  -LS      Action taken if English not primary language Pt also speaks English   -LS      Are you able to read Yes  -LS      Are you able to write Yes  -LS      How does patient learn best? Listening;Reading  -LS      Teaching needs identified Home Exercise Program;Management of Condition  -LS      Patient is concerned about/has problems with Climbing Stairs;Performing home management (household chores, shopping, care of dependents);Performing job responsibilities/community activities (work, school,;Performing sports, recreation, and play activities;Standing;Walking  -LS      Does patient have problems with the following? None  -LS      Barriers to learning None  -LS      Pt Participated in POC and Goals Yes  -LS         Safety    Are you being hurt, hit, or frightened by anyone at home or in your life? No  -LS      Are you being neglected by a caregiver No  -LS        User Key  (r) = Recorded By, (t) = Taken By, (c) = Cosigned By    Initials Name Provider Type    Bon Tobias PT Physical Therapist                PT Ortho     Row Name 11/07/18 1100       Precautions and Contraindications    Precautions/Limitations no known precautions/limitations  -LS       Posture/Observations    Posture/Observations Comments Mild rearfoot varus and supination on L to reduce WB on 1st met head  -LS       Special Tests/Palpation    Special Tests/Palpation  Ankle/Foot  -LS       Foot/Ankle Palpation    Foot/Ankle Palpation? Yes  -LS    Met Heads Left:;Bilateral:   Great toe  -LS    Metatarsals Left:;Tender   Digits 1-3  -LS       Ankle Accessory Motions    Ankle Accessory Motions Tested? Yes  -LS    Talocrural (talotibial) A-P glide Left:;WNL  -LS    Subtalar medial/lateral tilt Left:;Hypermobile  -LS    Tarsometatarsal dorsal/plantar glide Left:;Hypomobile  -LS       Toes Accessory Motion    Toes Accessory Motions Tested? Yes  -LS    MTP Hallux - flexion Left:;Hypomobile  -LS    MTP Hallux - extension Left:;Hypomobile  -LS    IP great toe Left:;Hypomobile  -LS       General ROM    RT Lower Ext Rt Ankle Dorsiflexion;Rt Ankle Plantarflexion;Rt Ankle Inversion;Rt Ankle Eversion;Comment  -LS    LT Lower Ext Lt Ankle Dorsiflexion;Lt Ankle Plantarflexion;Lt Ankle Inversion;Lt Ankle Eversion;Comment  -LS       Right Lower Ext    Rt Ankle Dorsiflexion AROM 10  -LS    Rt Ankle Plantarflexion AROM 55  -LS    Rt Ankle Inversion AROM 25  -LS    Rt Ankle Eversion AROM 50  -LS    RT Lower Extremity Comments 1st toe MTP ext 65, flexion 70  -LS       Left Lower Ext    Lt Ankle Dorsiflexion AROM 10  -LS    Lt Ankle Plantarflexion AROM 50   -LS    Lt Ankle Inversion AROM 25  -LS    Lt Ankle Eversion AROM 50  -LS    LT Lower Extremity Comments 1st toe MTP ext 35, flex 45  -LS       MMT (Manual Muscle Testing)    Rt Lower Ext Rt Ankle Plantarflexion;Rt Ankle Dorsiflexion;Rt Ankle Subtalar Inversion;Rt Ankle Subtalar Eversion;Rt MTP Flexion;Rt MTP Extension  -LS    Lt Lower Ext Lt Ankle Plantarflexion;Lt Ankle Dorsiflexion;Lt Ankle Subtalar Inversion;Lt Ankle Subtalar Eversion;Lt MTP Flexion;Lt MTP Extension  -LS       MMT Right Lower Ext    Rt Ankle Plantarflexion MMT, Gross Movement (5/5) normal  -LS    Rt Ankle Dorsiflexion MMT, Gross Movement (5/5) normal  -LS    Rt Ankle Subtalar Inversion MT, Gross Movement (5/5) normal  -LS    Rt Ankle Subtalar Eversion MMT, Gross Movement (5/5)  normal  -LS    Rt MTP Flexion MMT, Gross Movement (5/5) normal  -LS    Rt MTP Extension MMT, Gross Movement (5/5) normal  -LS       MMT Left Lower Ext    Lt Ankle Plantarflexion MMT, Gross Movement (5/5) normal  -LS    Lt Ankle Dorsiflexion MMT, Gross Movement (5/5) normal  -LS    Lt Ankle Subtalar Inversion MMT, Gross Movement (4/5) good  -LS    Lt Ankle Subtalar Eversion MMT, Gross Movement (4/5) good  -LS    Lt MTP Flexion MMT, Gross Movement (4-/5) good minus  -LS    Lt MTP Extension MMT, Gross Movement (4-/5) good minus  -LS       Sensation    Light Touch No apparent deficits  -LS    Sharp/Dull No apparent deficits  -LS       Pathomechanics    Pathomechanics Comments Appropriate squat technique with no pain or limitation  -LS       Gait/Stairs Assessment/Training    Comment (Gait/Stairs) Antalgic gait due to pain in L 1st met head - resultant supination and poor toe off in terminal stance  -LS      User Key  (r) = Recorded By, (t) = Taken By, (c) = Cosigned By    Initials Name Provider Type    LS Bon Malagon, PT Physical Therapist                      Therapy Education  Education Details: POC discussed in detail and agreed upon by the pt. Importance of LE strengthening to normalize function was stressed.   Given: HEP, Symptoms/condition management, Pain management, Posture/body mechanics  Program: New  How Provided: Verbal, Demonstration, Written  Provided to: Patient  Level of Understanding: Teach back education performed, Demonstrated, Verbalized           PT OP Goals     Row Name 11/07/18 1100          PT Short Term Goals    STG Date to Achieve 12/05/18  -LS     STG 1 Pt will be independent and compliant with HEP.   -LS     STG 1 Progress New  -LS     STG 2 Pt will decrease complaints of pain to 2/10 at rest, 3/10 with activity.  -LS     STG 2 Progress New  -LS     STG 3 Pt will demonstrate decreased TTP in 1st met head and metatarsals 1-3.   -LS     STG 3 Progress New  -LS     STG 4 Pt will display L great  toe extension 45, flexion 60.   -LS     STG 4 Progress New  -LS     STG 5 Pt will demonstrate L ankle MMT inv 4+/5, ev 4+/5, great toe ext 4/5, great toe flexion 4/5  -LS     STG 5 Progress New  -LS        Long Term Goals    LTG Date to Achieve 01/02/19  -LS     LTG 1 Pt will be independent and compliant with HEP and self-maintenance of condition.  -LS     LTG 1 Progress New  -LS     LTG 2 Pt will decrease complaints of pain to 0/10 at rest, 1/10 with activity.  -LS     LTG 2 Progress New  -LS     LTG 3 Pt will display L great toe extension 55, flexion 70.   -LS     LTG 3 Progress New  -LS     LTG 4 Pt will demonstrate L ankle MMT inv 5/5, ev 5/5, great toe ext 4+/5, great toe flexion 4+/5  -LS     LTG 4 Progress New  -LS     LTG 5 Pt will perform all ADLs with no limitations.   -LS     LTG 5 Progress New  -LS     LTG 6 Pt will return to recreational activities with no limitations.    -     LTG 6 Progress New  -        Time Calculation    PT Goal Re-Cert Due Date 02/05/19  -       User Key  (r) = Recorded By, (t) = Taken By, (c) = Cosigned By    Initials Name Provider Type    Bon Tobias, PT Physical Therapist                PT Assessment/Plan     Row Name 11/07/18 1100          PT Assessment    Functional Limitations Impaired gait;Limitation in home management;Limitations in community activities;Limitations in functional capacity and performance;Performance in leisure activities;Performance in sport activities;Performance in work activities  -     Impairments Impaired muscle endurance;Range of motion;Motor function;Muscle strength;Pain;Gait;Joint mobility  -     Assessment Comments Pt is a 33 yo F who presented S/P L great toe osteotomy to remove painful bunion on 6/7/18. She displayed decreased mobility of the great toe, namely in flexion and extension, as well as poor arthrokinematics between metatarsals 1-3. MMT of the L ankle revealed mild deficits in eversion and inversion, most likely  attributable to disuse. Ankle mobility is WFL on the L and joint play appears to have increased laxity, likely a compensation due to forefoot immobility. Gait was slightly altered with pt walking on the lateral side of her R foot to avoid inc pressure over the great met head. Pt will benefit from skilled PT to improve forefoot mobility, strength around the ankle, and proprioception by means of MT, ther ex, and modalities.   -LS     Please refer to paper survey for additional self-reported information Yes  -LS     Rehab Potential Excellent  -LS     Patient/caregiver participated in establishment of treatment plan and goals Yes  -LS     Patient would benefit from skilled therapy intervention Yes  -LS        PT Plan    PT Frequency 2x/week  -LS     Predicted Duration of Therapy Intervention (Therapy Eval) 8 weeks   -LS     Planned CPT's? PT EVAL LOW COMPLEXITY: 12404;PT RE-EVAL: 00003;PT NEUROMUSC RE-EDUCATION EA 15 MIN: 96210;PT MANUAL THERAPY EA 15 MIN: 46302;PT THER ACT EA 15 MIN: 87308;PT THER PROC EA 15 MIN: 74105;PT GAIT TRAINING EA 15 MIN: 23239;PT ELECTRICAL STIM UNATTEND: ;PT ULTRASOUND EA 15 MIN: 71671;PT IONTOPHORESIS EA 15 MIN: 17736  -LS     PT Plan Comments Pt will benefit from skilled PT to improve forefoot mobility, strength around the ankle, and proprioception by means of MT, ther ex, and modalities.   -LS       User Key  (r) = Recorded By, (t) = Taken By, (c) = Cosigned By    Initials Name Provider Type    Bon Tobias PT Physical Therapist                  Exercises     Row Name 11/07/18 1100             Precautions    Existing Precautions/Restrictions no known precautions/restrictions  -LS         Total Minutes    97139 - PT Therapeutic Exercise Minutes 10  -LS         Exercise 1    Exercise Name 1 HEP prescribed per external documentation and reviewed in detail.   -LS        User Key  (r) = Recorded By, (t) = Taken By, (c) = Cosigned By    Initials Name Provider Type    Bon Tobias PT  Physical Therapist                        Outcome Measure Options: Lower Extremity Functional Scale (LEFS)  Lower Extremity Functional Index  Any of your usual work, housework or school activities: Moderate difficulty  Your usual hobbies, recreational or sporting activities: Quite a bit of difficulty  Getting into or out of the bath: A little bit of difficulty  Walking between rooms: Moderate difficulty  Putting on your shoes or socks: Moderate difficulty  Squatting: A little bit of difficulty  Lifting an object, like a bag of groceries from the floor: No difficulty  Performing light activities around your home: Moderate difficulty  Performing heavy activities around your home: Moderate difficulty  Getting into or out of a car: No difficulty  Walking 2 blocks: Moderate difficulty  Walking a mile: Quite a bit of difficulty  Going up or down 10 stairs (about 1 flight of stairs): Moderate difficulty  Standing for 1 hour: Extreme difficulty or unable to perform activity  Sitting for 1 hour: No difficulty  Running on even ground: Extreme difficulty or unable to perform activity  Running on uneven ground: Extreme difficulty or unable to perform activity  Making sharp turns while running fast: Extreme difficulty or unable to perform activity  Hopping: Extreme difficulty or unable to perform activity  Rolling over in bed: No difficulty  Total: 38      Time Calculation:     Therapy Suggested Charges     Code   Minutes Charges    86105 (CPT®) Hc Pt Neuromusc Re Education Ea 15 Min      31310 (CPT®) Hc Pt Ther Proc Ea 15 Min 10 1    86236 (CPT®) Hc Gait Training Ea 15 Min      57076 (CPT®) Hc Pt Therapeutic Act Ea 15 Min      90980 (CPT®) Hc Pt Manual Therapy Ea 15 Min      24000 (CPT®) Hc Pt Ther Massage- Per 15 Min      42976 (CPT®) Hc Pt Iontophoresis Ea 15 Min      86229 (CPT®) Hc Pt Elec Stim Ea-Per 15 Min      28566 (CPT®) Hc Pt Ultrasound Ea 15 Min      72791 (CPT®) Hc Pt Self Care/Mgmt/Train Ea 15 Min      31899 (CPT®)  Hc Pt Prosthetic (S) Train Initial Encounter, Each 15 Min      20221 (CPT®) Hc Orthotic(S) Mgmt/Train Initial Encounter, Each 15min      41170 (CPT®) Hc Pt Aquatic Therapy Ea 15 Min      28350 (CPT®) Hc Pt Orthotic(S)/Prosthetic(S) Encounter, Each 15 Min       (CPT®) Hc Pt Electrical Stim Unattended      Total  10 1          Start Time: 1115     Therapy Charges for Today     Code Description Service Date Service Provider Modifiers Qty    12690360324 HC PT THER PROC EA 15 MIN 11/7/2018 Bon Malagon, PT GP 1    34016909872 HC PT EVAL LOW COMPLEXITY 2 11/7/2018 Bon Malagon, PT GP 1          PT G-Codes  Outcome Measure Options: Lower Extremity Functional Scale (LEFS)  Total: 38         Bon Malagon PT  11/7/2018

## 2018-11-23 ENCOUNTER — HOSPITAL ENCOUNTER (OUTPATIENT)
Dept: PHYSICAL THERAPY | Facility: HOSPITAL | Age: 33
Setting detail: THERAPIES SERIES
Discharge: HOME | End: 2018-11-23
Attending: ORTHOPAEDIC SURGERY

## 2018-11-23 DIAGNOSIS — Z98.890 S/P BUNIONECTOMY: Primary | ICD-10-CM

## 2018-11-23 PROCEDURE — 97110 THERAPEUTIC EXERCISES: CPT | Performed by: PHYSICAL THERAPIST

## 2018-11-23 PROCEDURE — 97140 MANUAL THERAPY 1/> REGIONS: CPT | Performed by: PHYSICAL THERAPIST

## 2018-11-23 NOTE — THERAPY TREATMENT NOTE
Outpatient Physical Therapy Ortho Treatment Note   [place]     Patient Name: Mayela D ArreolaReyes  : 1985  MRN: [id number]  Today's Date: 2018      Visit Date: 2018    Visit Dx:    ICD-10-CM ICD-9-CM   1. S/P bunionectomy Z98.890 V45.89       Patient Active Problem List   Diagnosis   • Acquired metatarsus adductus of left foot   • Surgery follow-up        No past medical history on file.     Past Surgical History:   Procedure Laterality Date   • FOOT OSTEOTOMY         PT Ortho     Row Name 18 1600       Subjective Comments    Subjective Comments  Pt stated that her pain levels have been about the same since her IE, though she feels that her great toe mobility and function are already improving. She has not been fully compliant with her HEP and has had mild difficulty with stretching her L great toe into flexion due to pain. She has been performing stretches in the evening so that she does not have to bear weight afterwards. She was in [place] last week and missed her last PT visit as a result.   -LS       Precautions and Contraindications    Precautions/Limitations  no known precautions/limitations  -LS      User Key  (r) = Recorded By, (t) = Taken By, (c) = Cosigned By    Initials Name Provider Type    LS Bon Malagon PT Physical Therapist                      PT Assessment/Plan     Row Name 18 1600          PT Assessment    Assessment Comments  Pt presented for her first f/u since IE with little change in status. She continued to display restrictive jt mobility in the first 3 metatarsals on the L foot as well as MTP and IP of the great toe. MT was tolerated well with the exception of stretching into great toe flexion which resulted in mild inc in pain. HEP was reviewed and practiced in addition to other exercises per external documentation to stress functional ROM of the great toe and foot. Pt demonstrated poor great toe extension with gait and step down during stair  training but verbal cues resulted in improved technique.   -        PT Plan    PT Plan Comments  Ther ex progression to strengthen kinetic chain and improve great toe mobility. MT to improve mobility in the metatarsals and great toe IPs.   -       User Key  (r) = Recorded By, (t) = Taken By, (c) = Cosigned By    Initials Name Provider Type    Bon Tobias, PT Physical Therapist              Exercises     Row Name 11/23/18 1600             Precautions    Existing Precautions/Restrictions  no known precautions/restrictions  -         Subjective Comments    Subjective Comments  Pt stated that her pain levels have been about the same since her IE, though she feels that her great toe mobility and function are already improving. She has not been fully compliant with her HEP and has had mild difficulty with stretching her L great toe into flexion due to pain. She has been performing stretches in the evening so that she does not have to bear weight afterwards. She was in [place] last week and missed her last PT visit as a result.   -         Total Minutes    69832 - PT Therapeutic Exercise Minutes  25  -LS      00693 - PT Manual Therapy Minutes  15  -LS         Exercise 1    Exercise Name 1  Ther ex per external flowsheet to reinforce HEP and emphasize L great toe mobility and kinetic chain stability.   -        User Key  (r) = Recorded By, (t) = Taken By, (c) = Cosigned By    Initials Name Provider Type    Bon Tobias, PT Physical Therapist                        Manual Rx (last 36 hours)      Manual Treatments     Row Name 11/23/18 1600             Total Minutes    48675 - PT Manual Therapy Minutes  15  -LS         Manual Rx 1    Manual Rx 1 Location  L great toe   -LS      Manual Rx 1 Type  PA glides of metatarsals 1-3 and met heads 1-3  -LS      Manual Rx 1 Grade  3/4  -LS         Manual Rx 2    Manual Rx 2 Location  L great toe   -LS      Manual Rx 2 Type  Stretching into flexion and extension at  each jt.   -LS        User Key  (r) = Recorded By, (t) = Taken By, (c) = Cosigned By    Initials Name Provider Type    Bon Tobias PT Physical Therapist                             Time Calculation:   Start Time: 1430  Therapy Suggested Charges     Code   Minutes Charges    41127 (CPT®) Hc Pt Neuromusc Re Education Ea 15 Min      73917 (CPT®) Hc Pt Ther Proc Ea 15 Min 25 2    20717 (CPT®) Hc Gait Training Ea 15 Min      71906 (CPT®) Hc Pt Therapeutic Act Ea 15 Min      45491 (CPT®) Hc Pt Manual Therapy Ea 15 Min 15 1    90252 (CPT®) Hc Pt Ther Massage- Per 15 Min      55617 (CPT®) Hc Pt Iontophoresis Ea 15 Min      11660 (CPT®) Hc Pt Elec Stim Ea-Per 15 Min      97408 (CPT®) Hc Pt Ultrasound Ea 15 Min      52041 (CPT®) Hc Pt Self Care/Mgmt/Train Ea 15 Min      70535 (CPT®) Hc Pt Prosthetic (S) Train Initial Encounter, Each 15 Min      49849 (CPT®) Hc Orthotic(S) Mgmt/Train Initial Encounter, Each 15min      16776 (CPT®) Hc Pt Aquatic Therapy Ea 15 Min      73890 (CPT®) Hc Pt Orthotic(S)/Prosthetic(S) Encounter, Each 15 Min       (CPT®) Hc Pt Electrical Stim Unattended      Total  40 3        Therapy Charges for Today     Code Description Service Date Service Provider Modifiers Qty    42347164450 HC PT THER PROC EA 15 MIN 11/23/2018 Bon Malagon, PT GP 2    89231083550 HC PT MANUAL THERAPY EA 15 MIN 11/23/2018 Bon Malagon, PT GP 1                    Bon Malagon PT  11/23/2018

## 2018-12-04 ENCOUNTER — TELEPHONE (OUTPATIENT)
Dept: ORTHOPEDIC SURGERY | Facility: CLINIC | Age: 33
End: 2018-12-04

## 2018-12-04 NOTE — TELEPHONE ENCOUNTER
Patient came in to office stating that her work needs a more specific date for her return to work. Her last letter said just until after her appointment on 1/18/19 with Dr. Kendall, but they need something more specific.

## 2018-12-07 ENCOUNTER — HOSPITAL ENCOUNTER (OUTPATIENT)
Dept: PHYSICAL THERAPY | Facility: HOSPITAL | Age: 33
Setting detail: THERAPIES SERIES
Discharge: HOME | End: 2018-12-07
Attending: ORTHOPAEDIC SURGERY

## 2018-12-07 DIAGNOSIS — Z98.890 S/P BUNIONECTOMY: Primary | ICD-10-CM

## 2018-12-07 PROCEDURE — 97140 MANUAL THERAPY 1/> REGIONS: CPT | Performed by: PHYSICAL THERAPIST

## 2018-12-07 PROCEDURE — 97110 THERAPEUTIC EXERCISES: CPT | Performed by: PHYSICAL THERAPIST

## 2018-12-07 NOTE — THERAPY PROGRESS REPORT/RE-CERT
Outpatient Physical Therapy Ortho Progress Note   [facility]     Patient Name: Mayela D ArreolaReyes  : 1985  MRN: [id number]  Today's Date: 2018      Visit Date: 2018    Patient Active Problem List   Diagnosis   • Acquired metatarsus adductus of left foot   • Surgery follow-up        No past medical history on file.     Past Surgical History:   Procedure Laterality Date   • FOOT OSTEOTOMY         Visit Dx:     ICD-10-CM ICD-9-CM   1. S/P bunionectomy Z98.890 V45.89           PT Ortho     Row Name 18 1600       Subjective Comments    Subjective Comments  Pt stated that her L foot/great toe has continued to improve in regards to pain and motion. She has been compliant with her HEP and feels that the stretches have resulted in more mobility. She has attempted return to a walking program in the last week and has had no pain in her toes.   -LS       Precautions and Contraindications    Precautions/Limitations  no known precautions/limitations  -LS       Subjective Pain    Able to rate subjective pain?  yes  -LS    Pre-Treatment Pain Level  0  -LS    Post-Treatment Pain Level  0  -LS       Foot/Ankle Palpation    Met Heads  -- No TTP   -LS    Metatarsals  -- No TTP   -LS       Ankle Accessory Motions    Tarsometatarsal dorsal/plantar glide  Left:;Hypomobile  -LS       Toes Accessory Motion    MTP Hallux - flexion  Left:;Hypomobile  -LS    MTP Hallux - extension  Left:;Hypomobile  -LS    IP great toe  Left:;Hypomobile  -LS       Left Lower Ext    Lt Ankle Dorsiflexion AROM  10  -LS    Lt Ankle Plantarflexion AROM  60  -LS    LT Lower Extremity Comments  1st toe MTP ext 60, flex 70  -LS       MMT Left Lower Ext    Lt Ankle Subtalar Inversion MMT, Gross Movement  (4+/5) good plus  -LS    Lt Ankle Subtalar Eversion MMT, Gross Movement  (4+/5) good plus  -LS    Lt MTP Flexion MMT, Gross Movement  (4+/5) good plus  -LS    Lt MTP Extension MMT, Gross Movement  (5/5) normal  -LS       Gait/Stairs  Assessment/Training    Comment (Gait/Stairs)  No apparent antalgic gait pattern   -LS      User Key  (r) = Recorded By, (t) = Taken By, (c) = Cosigned By    Initials Name Provider Type    Bon Tobias, PT Physical Therapist                            PT OP Goals     Row Name 12/07/18 1600          PT Short Term Goals    STG Date to Achieve  12/05/18  -LS     STG 1  Pt will be independent and compliant with HEP.   -LS     STG 1 Progress  Met  -LS     STG 2  Pt will decrease complaints of pain to 2/10 at rest, 3/10 with activity.  -LS     STG 2 Progress  Met  -LS     STG 3  Pt will demonstrate decreased TTP in 1st met head and metatarsals 1-3.   -LS     STG 3 Progress  Met  -LS     STG 4  Pt will display L great toe extension 45, flexion 60.   -LS     STG 4 Progress  Met  -LS     STG 5  Pt will demonstrate L ankle MMT inv 4+/5, ev 4+/5, great toe ext 4/5, great toe flexion 4/5  -LS     STG 5 Progress  Progressing  -LS        Long Term Goals    LTG Date to Achieve  01/02/19  -LS     LTG 1  Pt will be independent and compliant with HEP and self-maintenance of condition.  -LS     LTG 1 Progress  Ongoing  -LS     LTG 2  Pt will decrease complaints of pain to 0/10 at rest, 1/10 with activity.  -LS     LTG 2 Progress  Progressing  -LS     LTG 3  Pt will display L great toe extension 55, flexion 70.   -LS     LTG 3 Progress  Met  -LS     LTG 4  Pt will demonstrate L ankle MMT inv 5/5, ev 5/5, great toe ext 4+/5, great toe flexion 4+/5  -LS     LTG 4 Progress  Ongoing  -LS     LTG 5  Pt will perform all ADLs with no limitations.   -LS     LTG 5 Progress  Ongoing  -LS     LTG 6  Pt will return to recreational activities with no limitations.    -LS     LTG 6 Progress  Ongoing  -LS       User Key  (r) = Recorded By, (t) = Taken By, (c) = Cosigned By    Initials Name Provider Type    Bon Tobias PT Physical Therapist          PT Assessment/Plan     Row Name 12/07/18 1600          PT Assessment    Functional Limitations   Impaired gait;Limitations in community activities;Limitation in home management;Performance in work activities;Performance in sport activities;Performance in leisure activities;Limitations in functional capacity and performance  -LS     Impairments  Range of motion;Joint mobility;Poor body mechanics;Pain;Muscle strength;Impaired muscle endurance;Sensation;Gait  -LS     Assessment Comments  Pt is progressing well with PT, despite inconsistent attendance. She has been compliant with her HEP, which has resulted in significant improvements in ROM and strength, and pt has met all but one of her short term goals. She continues to lack strength in ankle inv/ev and great toe flexion, but all measures are improving. She is tolerating increased activity and has been able to return to recreational exercise in a limited capacity. She continues to have pain with forced great toe extension, particularly with stair descent, and poor heel-off is noted as a result. Exercises per external flowsheet were tolerated well during tx with no inc in pain, and pt is able to tolerate inc reps and external load without compensations or pain.   -LS     Please refer to paper survey for additional self-reported information  Yes  -LS     Rehab Potential  Excellent  -LS     Patient/caregiver participated in establishment of treatment plan and goals  Yes  -LS     Patient would benefit from skilled therapy intervention  Yes  -LS        PT Plan    PT Frequency  1x/week  -LS     Predicted Duration of Therapy Intervention (Therapy Eval)  8 weeks   -LS     Planned CPT's?  PT EVAL LOW COMPLEXITY: 61464;PT RE-EVAL: 72412;PT THER PROC EA 15 MIN: 32992;PT THER ACT EA 15 MIN: 42216;PT GAIT TRAINING EA 15 MIN: 44176;PT NEUROMUSC RE-EDUCATION EA 15 MIN: 56807;PT MANUAL THERAPY EA 15 MIN: 20146;PT SELF CARE/HOME MGMT/TRAIN EA 15: 12277  -LS     PT Plan Comments  Continue ther ex and gait training to emphasize AROM of the foot/ankle/toe with strengthening of the  kinetic chain. MT for improved jt mobility of the metatarsals and MTPs.   -LS       User Key  (r) = Recorded By, (t) = Taken By, (c) = Cosigned By    Initials Name Provider Type    Bon Tobias PT Physical Therapist            Exercises     Row Name 12/07/18 1600             Precautions    Existing Precautions/Restrictions  no known precautions/restrictions  -LS         Subjective Comments    Subjective Comments  Pt stated that her L foot/great toe has continued to improve in regards to pain and motion. She has been compliant with her HEP and feels that the stretches have resulted in more mobility. She has attempted return to a walking program in the last week and has had no pain in her toes.   -LS         Subjective Pain    Able to rate subjective pain?  yes  -LS      Pre-Treatment Pain Level  0  -LS      Post-Treatment Pain Level  0  -LS         Total Minutes    31772 - PT Therapeutic Exercise Minutes  17  -LS      75827 - PT Manual Therapy Minutes  23  -LS         Exercise 1    Exercise Name 1  Ther ex per external flowsheet to reinforce HEP and emphasize L great toe mobility and kinetic chain stability.   -LS        User Key  (r) = Recorded By, (t) = Taken By, (c) = Cosigned By    Initials Name Provider Type    Bon Tobias PT Physical Therapist           Manual Rx (last 36 hours)      Manual Treatments     Row Name 12/07/18 1600             Total Minutes    76043 - PT Manual Therapy Minutes  23  -LS         Manual Rx 1    Manual Rx 1 Location  L great toe   -LS      Manual Rx 1 Type  PA glides of metatarsals 1-3 and met heads 1-3  -LS      Manual Rx 1 Grade  3/4  -LS         Manual Rx 2    Manual Rx 2 Location  L great toe   -LS      Manual Rx 2 Type  Stretching into flexion and extension at each jt.   -LS         Manual Rx 3    Manual Rx 3 Location  Reassessment   -LS        User Key  (r) = Recorded By, (t) = Taken By, (c) = Cosigned By    Initials Name Provider Type    Bon Tobias PT Physical  Therapist                      Outcome Measure Options: Lower Extremity Functional Scale (LEFS)  Lower Extremity Functional Index  Any of your usual work, housework or school activities: A little bit of difficulty  Your usual hobbies, recreational or sporting activities: A little bit of difficulty  Getting into or out of the bath: No difficulty  Walking between rooms: No difficulty  Putting on your shoes or socks: No difficulty  Squatting: No difficulty  Lifting an object, like a bag of groceries from the floor: No difficulty  Performing light activities around your home: A little bit of difficulty  Performing heavy activities around your home: A little bit of difficulty  Getting into or out of a car: No difficulty  Walking 2 blocks: A little bit of difficulty  Walking a mile: A little bit of difficulty  Going up or down 10 stairs (about 1 flight of stairs): A little bit of difficulty  Standing for 1 hour: A little bit of difficulty  Sitting for 1 hour: No difficulty  Running on even ground: Extreme difficulty or unable to perform activity  Running on uneven ground: Extreme difficulty or unable to perform activity  Making sharp turns while running fast: Moderate difficulty  Hopping: Extreme difficulty or unable to perform activity  Rolling over in bed: No difficulty  Total: 58      Time Calculation:     Therapy Suggested Charges     Code   Minutes Charges    54032 (CPT®) Hc Pt Neuromusc Re Education Ea 15 Min      79441 (CPT®) Hc Pt Ther Proc Ea 15 Min 17 1    03377 (CPT®) Hc Gait Training Ea 15 Min      54818 (CPT®) Hc Pt Therapeutic Act Ea 15 Min      90653 (CPT®) Hc Pt Manual Therapy Ea 15 Min 23 2    92232 (CPT®) Hc Pt Ther Massage- Per 15 Min      67859 (CPT®) Hc Pt Iontophoresis Ea 15 Min      83280 (CPT®) Hc Pt Elec Stim Ea-Per 15 Min      36733 (CPT®) Hc Pt Ultrasound Ea 15 Min      63619 (CPT®) Hc Pt Self Care/Mgmt/Train Ea 15 Min      90155 (CPT®) Hc Pt Prosthetic (S) Train Initial Encounter, Each 15 Min       06443 (CPT®) Hc Orthotic(S) Mgmt/Train Initial Encounter, Each 15min      29458 (CPT®) Hc Pt Aquatic Therapy Ea 15 Min      04765 (CPT®) Hc Pt Orthotic(S)/Prosthetic(S) Encounter, Each 15 Min       (CPT®) Hc Pt Electrical Stim Unattended      Total  40 3          Start Time: 1515     Therapy Charges for Today     Code Description Service Date Service Provider Modifiers Qty    25048627434 HC PT THER PROC EA 15 MIN 12/7/2018 Bon Malagon, PT GP 1    10420514391 HC PT MANUAL THERAPY EA 15 MIN 12/7/2018 Bon Malagon, PT GP 2          PT G-Codes  Outcome Measure Options: Lower Extremity Functional Scale (LEFS)  Total: 58         Bon Malagon PT  12/7/2018

## 2018-12-17 ENCOUNTER — HOSPITAL ENCOUNTER (OUTPATIENT)
Dept: PHYSICAL THERAPY | Facility: HOSPITAL | Age: 33
Setting detail: THERAPIES SERIES
Discharge: HOME | End: 2018-12-17
Attending: ORTHOPAEDIC SURGERY

## 2018-12-17 DIAGNOSIS — Z98.890 S/P BUNIONECTOMY: Primary | ICD-10-CM

## 2018-12-17 PROCEDURE — 97140 MANUAL THERAPY 1/> REGIONS: CPT | Performed by: PHYSICAL THERAPIST

## 2018-12-17 PROCEDURE — 97110 THERAPEUTIC EXERCISES: CPT | Performed by: PHYSICAL THERAPIST

## 2018-12-17 NOTE — THERAPY TREATMENT NOTE
Outpatient Physical Therapy Ortho Treatment Note  [facility]     Patient Name: Mayela D ArreolaReyes  : 1985  MRN: [id number]  Today's Date: 2018      Visit Date: 2018    Visit Dx:    ICD-10-CM ICD-9-CM   1. S/P bunionectomy Z98.890 V45.89       Patient Active Problem List   Diagnosis   • Acquired metatarsus adductus of left foot   • Surgery follow-up        No past medical history on file.     Past Surgical History:   Procedure Laterality Date   • FOOT OSTEOTOMY         PT Ortho     Row Name 18 1100       Subjective Comments    Subjective Comments  Pt reported that her toe/foot were feeling much better and stated she has not had any pain since her last visit. She has been walking increased distances with no difficulty and feels that her mobility is improving. She has been compliant with her HEP and feels her flexibility and strength are improving. 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 She has had mild inc in pain when wearing flats at [employer].   -LS       Precautions and Contraindications    Precautions/Limitations  no known precautions/limitations  -LS       Subjective Pain    Able to rate subjective pain?  yes  -LS    Pre-Treatment Pain Level  0  -LS    Post-Treatment Pain Level  0  -LS      User Key  (r) = Recorded By, (t) = Taken By, (c) = Cosigned By    Initials Name Provider Type    Bon Tobias PT Physical Therapist                      PT Assessment/Plan     Row Name 18 1100          PT Assessment    Assessment Comments  Pt continues to be pain-free in her foot/great toe including with exercise. Exercises were progressed to include light plyometrics and dynamic stabilization in SLS. She tolerated progressive exercises well, though she was apprehensive with initial reps due to fear of rapid weight bearing. Once she experienced initial pain-free reps, pt became much more fluid with her movements, though  she experienced difficulty balancing on unstable surfaces on her L LE. She likely has difficulty with balance in SLS due to limited mobility of the medial forefoot, which places her at higher risk of injury to surrounding structures due to increased mobility requirement. She continued to display limited mobility with metatarsal glides in the 1st and 2nd metatarsals, as well as in the IP jts of the great toe. She was educated on self-mobilizations to be performed independently to improve jt mobility. Pt was asked to wear her steel-toed boots between this visit and her next to monitor response to footwear.   -LS        PT Plan    PT Plan Comments  Continue ther ex progression emphasizing dynamic stability of the L foot and ankle. MT for improvement of jt mobility. Footwear options will be discussed if pt reports pain with steel-toed boots.  -LS       User Key  (r) = Recorded By, (t) = Taken By, (c) = Cosigned By    Initials Name Provider Type    LS Bon Malagon, PT Physical Therapist              Exercises     Row Name 12/17/18 1100             Precautions    Existing Precautions/Restrictions  no known precautions/restrictions  -LS         Subjective Comments    Subjective Comments  Pt reported that her toe/foot were feeling much better and stated she has not had any pain since her last visit. She has been walking increased distances with no difficulty and feels that her mobility is improving. She has been compliant with her HEP and feels her flexibility and strength are improving. 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 She has had mild inc in pain when wearing flats at [employer].   -LS         Subjective Pain    Able to rate subjective pain?  yes  -LS      Pre-Treatment Pain Level  0  -LS      Post-Treatment Pain Level  0  -LS         Total Minutes    25503 - PT Therapeutic Exercise Minutes  18  -LS      02831 - PT Manual Therapy Minutes  15  -LS          Exercise 1    Exercise Name 1  Ther ex per external flowsheet with emphasis on kinetic chain strengthening and foot/ankle stability.   -LS        User Key  (r) = Recorded By, (t) = Taken By, (c) = Cosigned By    Initials Name Provider Type    Bon Tobias PT Physical Therapist                        Manual Rx (last 36 hours)      Manual Treatments     Row Name 12/17/18 1100             Total Minutes    77692 - PT Manual Therapy Minutes  15  -LS         Manual Rx 1    Manual Rx 1 Location  L great toe   -LS      Manual Rx 1 Type  PA glides of metatarsals 1-3 and met heads 1-3  -LS      Manual Rx 1 Grade  3/4  -LS         Manual Rx 2    Manual Rx 2 Location  L great toe   -LS      Manual Rx 2 Type  Stretching into flexion and extension at each jt.   -LS        User Key  (r) = Recorded By, (t) = Taken By, (c) = Cosigned By    Initials Name Provider Type    Bon Tobias PT Physical Therapist                             Time Calculation:   Start Time: 1115  Therapy Suggested Charges     Code   Minutes Charges    41188 (CPT®) Hc Pt Neuromusc Re Education Ea 15 Min      93322 (CPT®) Hc Pt Ther Proc Ea 15 Min 18 1    14735 (CPT®) Hc Gait Training Ea 15 Min      88534 (CPT®) Hc Pt Therapeutic Act Ea 15 Min      44889 (CPT®) Hc Pt Manual Therapy Ea 15 Min 15 1    39126 (CPT®) Hc Pt Ther Massage- Per 15 Min      61844 (CPT®) Hc Pt Iontophoresis Ea 15 Min      71565 (CPT®) Hc Pt Elec Stim Ea-Per 15 Min      78781 (CPT®) Hc Pt Ultrasound Ea 15 Min      68263 (CPT®) Hc Pt Self Care/Mgmt/Train Ea 15 Min      66683 (CPT®) Hc Pt Prosthetic (S) Train Initial Encounter, Each 15 Min      87300 (CPT®) Hc Orthotic(S) Mgmt/Train Initial Encounter, Each 15min      23318 (CPT®) Hc Pt Aquatic Therapy Ea 15 Min      69008 (CPT®) Hc Pt Orthotic(S)/Prosthetic(S) Encounter, Each 15 Min       (CPT®) Hc Pt Electrical Stim Unattended      Total  33 2        Therapy Charges for Today     Code Description Service Date Service Provider  Modifiers Qty    74846382447  PT THER PROC EA 15 MIN 12/17/2018 Bon Malagon, PT GP 1    [id number]  PT MANUAL THERAPY EA 15 MIN 12/17/2018 Bon Malagon, PT GP 1                    Bon Malagon, SAIMA  12/17/2018

## 2018-12-31 ENCOUNTER — HOSPITAL ENCOUNTER (OUTPATIENT)
Dept: PHYSICAL THERAPY | Facility: HOSPITAL | Age: 33
Setting detail: THERAPIES SERIES
Discharge: HOME | End: 2018-12-31
Attending: ORTHOPAEDIC SURGERY

## 2018-12-31 DIAGNOSIS — Z98.890 S/P BUNIONECTOMY: Primary | ICD-10-CM

## 2018-12-31 PROCEDURE — 97110 THERAPEUTIC EXERCISES: CPT | Performed by: PHYSICAL THERAPIST

## 2018-12-31 PROCEDURE — 97140 MANUAL THERAPY 1/> REGIONS: CPT | Performed by: PHYSICAL THERAPIST

## 2019-01-28 ENCOUNTER — DOCUMENTATION (OUTPATIENT)
Dept: PHYSICAL THERAPY | Facility: HOSPITAL | Age: 34
End: 2019-01-28

## 2019-01-28 DIAGNOSIS — Z98.890 S/P BUNIONECTOMY: Primary | ICD-10-CM

## 2019-02-06 ENCOUNTER — OFFICE VISIT (OUTPATIENT)
Dept: ORTHOPEDIC SURGERY | Facility: CLINIC | Age: 34
End: 2019-02-06

## 2019-02-06 VITALS — OXYGEN SATURATION: 97 % | HEART RATE: 84 BPM | HEIGHT: 64 IN | BODY MASS INDEX: 25.56 KG/M2 | WEIGHT: 149.69 LBS

## 2019-02-06 DIAGNOSIS — Z47.89 AFTERCARE FOLLOWING SURGERY OF THE MUSCULOSKELETAL SYSTEM: Primary | ICD-10-CM

## 2019-02-06 PROCEDURE — 99212 OFFICE O/P EST SF 10 MIN: CPT | Performed by: ORTHOPAEDIC SURGERY

## 2019-02-06 NOTE — PROGRESS NOTES
ESTABLISHED PATIENT    Patient: Mayela D ArreolaReyes  : 1985    Primary Care Provider: Sarah Salmon PA    Requesting Provider: As above    Post-op Follow-up (3 month f/u; 7 months s/p Revision of Osteotomy Left Foot 18)      History    Chief Complaint: Status post revision left forefoot surgery, 18    History of Present Illness: She is much improved.  She has much better range of motion.  She notes occasional pain over the screws.    No current outpatient medications on file prior to visit.     No current facility-administered medications on file prior to visit.       No Known Allergies   History reviewed. No pertinent past medical history.  Past Surgical History:   Procedure Laterality Date   • FOOT OSTEOTOMY     • FOOT OSTEOTOMY Left 2018    Procedure: REVISION OF OSTEOTOMY LEFT FOOT;  Surgeon: Albina Banuelos MD;  Location: [facility] to [facility];  Service: Orthopedics     Family History   Problem Relation Age of Onset   • Stroke Mother    • Arthritis Father       Social History     Socioeconomic History   • Marital status:      Spouse name: Not on file   • Number of children: Not on file   • Years of education: Not on file   • Highest education level: Not on file   Social Needs   • Financial resource strain: Not on file   • Food insecurity - worry: Not on file   • Food insecurity - inability: Not on file   • Transportation needs - medical: Not on file   • Transportation needs - non-medical: Not on file   Occupational History   • Not on file   Tobacco Use   • Smoking status: Former Smoker     Types: Cigarettes   • Smokeless tobacco: Never Used   Substance and Sexual Activity   • Alcohol use: Yes     Alcohol/week: 0.6 oz     Types: 1 Glasses of wine per week   • Drug use: No   • Sexual activity: Defer   Other Topics Concern   • Not on file   Social History Narrative   • Not on file        Review of Systems   Constitutional: Negative.    HENT: Negative.    Eyes: Negative.   "  Respiratory: Negative.    Cardiovascular: Negative.    Gastrointestinal: Negative.    Endocrine: Negative.    Genitourinary: Negative.    Musculoskeletal: Negative.         Patient not having any trouble just a follow up.   Skin: Negative.    Allergic/Immunologic: Negative.    Neurological: Negative.    Hematological: Negative.    Psychiatric/Behavioral: Negative.        The following portions of the patient's history were reviewed and updated as appropriate: allergies, current medications, past family history, past medical history, past social history, past surgical history and problem list.    Physical Exam:   Pulse 84   Ht 162.6 cm (64.02\")   Wt 67.9 kg (149 lb 11.1 oz)   SpO2 97%   BMI 25.68 kg/m²   GENERAL: Body habitus: normal weight for height    Lower extremity edema: Left: none; Right: none    Gait: normal     Mental Status:  awake and alert; oriented to person, place, and time  MSK:   Left:  Excellent alignment left foot, 25° dorsiflexion plantarflexion great toe, incisions fully healed, no tenderness    NEURO Sensation:  intact     Medical Decision Making    Data Review:   ordered and reviewed x-rays today    Assessment/Plan/Diagnosis/Treatment Options:   1. Aftercare following surgery of the musculoskeletal system  She has done very well.  She would like to try working and I think that's acceptable.  If the screws bother her we can remove them at a year.  I will be happy to see her any time.  - XR Foot 2 View Left                            "

## 2022-01-07 ENCOUNTER — HOSPITAL ENCOUNTER (EMERGENCY)
Facility: HOSPITAL | Age: 37
Discharge: HOME | End: 2022-01-08
Attending: EMERGENCY MEDICINE | Admitting: EMERGENCY MEDICINE

## 2022-01-07 ENCOUNTER — APPOINTMENT (OUTPATIENT)
Dept: ULTRASOUND IMAGING | Facility: HOSPITAL | Age: 37
End: 2022-01-07

## 2022-01-07 DIAGNOSIS — R55 NEAR SYNCOPE: ICD-10-CM

## 2022-01-07 DIAGNOSIS — N92.1 MENORRHAGIA WITH IRREGULAR CYCLE: Primary | ICD-10-CM

## 2022-01-07 DIAGNOSIS — R42 DIZZINESS ON STANDING: ICD-10-CM

## 2022-01-07 DIAGNOSIS — N93.8 DYSFUNCTIONAL UTERINE BLEEDING: ICD-10-CM

## 2022-01-07 DIAGNOSIS — D64.9 ANEMIA, UNSPECIFIED TYPE: ICD-10-CM

## 2022-01-07 LAB
ABO GROUP BLD: NORMAL
ALBUMIN SERPL-MCNC: 4.1 G/DL (ref 3.5–5.2)
ALBUMIN/GLOB SERPL: 1.2 G/DL
ALP SERPL-CCNC: 79 U/L (ref 39–117)
ALT SERPL W P-5'-P-CCNC: 25 U/L (ref 1–33)
ANION GAP SERPL CALCULATED.3IONS-SCNC: 11 MMOL/L (ref 5–15)
AST SERPL-CCNC: 22 U/L (ref 1–32)
B-HCG UR QL: NEGATIVE
BASOPHILS # BLD AUTO: 0.02 10*3/MM3 (ref 0–0.2)
BASOPHILS NFR BLD AUTO: 0.4 % (ref 0–1.5)
BILIRUB SERPL-MCNC: <0.2 MG/DL (ref 0–1.2)
BLD GP AB SCN SERPL QL: NEGATIVE
BUN SERPL-MCNC: 12 MG/DL (ref 6–20)
BUN/CREAT SERPL: 19 (ref 7–25)
CALCIUM SPEC-SCNC: 8.6 MG/DL (ref 8.6–10.5)
CHLORIDE SERPL-SCNC: 101 MMOL/L (ref 98–107)
CO2 SERPL-SCNC: 25 MMOL/L (ref 22–29)
CREAT SERPL-MCNC: 0.63 MG/DL (ref 0.57–1)
DEPRECATED RDW RBC AUTO: 45.4 FL (ref 37–54)
EOSINOPHIL # BLD AUTO: 0.35 10*3/MM3 (ref 0–0.4)
EOSINOPHIL NFR BLD AUTO: 7.2 % (ref 0.3–6.2)
ERYTHROCYTE [DISTWIDTH] IN BLOOD BY AUTOMATED COUNT: 16.7 % (ref 12.3–15.4)
EXPIRATION DATE: NORMAL
GFR SERPL CREATININE-BSD FRML MDRD: 106 ML/MIN/1.73
GLOBULIN UR ELPH-MCNC: 3.4 GM/DL
GLUCOSE SERPL-MCNC: 100 MG/DL (ref 65–99)
HCG INTACT+B SERPL-ACNC: <0.1 MIU/ML
HCT VFR BLD AUTO: 30.2 % (ref 34–46.6)
HGB BLD-MCNC: 9.5 G/DL (ref 12–15.9)
HOLD SPECIMEN: NORMAL
HOLD SPECIMEN: NORMAL
IMM GRANULOCYTES # BLD AUTO: 0.01 10*3/MM3 (ref 0–0.05)
IMM GRANULOCYTES NFR BLD AUTO: 0.2 % (ref 0–0.5)
INTERNAL NEGATIVE CONTROL: NEGATIVE
INTERNAL POSITIVE CONTROL: POSITIVE
IRON 24H UR-MRATE: 208 MCG/DL (ref 37–145)
IRON SATN MFR SERPL: 37 % (ref 20–50)
LYMPHOCYTES # BLD AUTO: 0.82 10*3/MM3 (ref 0.7–3.1)
LYMPHOCYTES NFR BLD AUTO: 16.8 % (ref 19.6–45.3)
Lab: NORMAL
MCH RBC QN AUTO: 23.8 PG (ref 26.6–33)
MCHC RBC AUTO-ENTMCNC: 31.5 G/DL (ref 31.5–35.7)
MCV RBC AUTO: 75.5 FL (ref 79–97)
MONOCYTES # BLD AUTO: 0.39 10*3/MM3 (ref 0.1–0.9)
MONOCYTES NFR BLD AUTO: 8 % (ref 5–12)
NEUTROPHILS NFR BLD AUTO: 3.3 10*3/MM3 (ref 1.7–7)
NEUTROPHILS NFR BLD AUTO: 67.4 % (ref 42.7–76)
NRBC BLD AUTO-RTO: 0 /100 WBC (ref 0–0.2)
PLATELET # BLD AUTO: 315 10*3/MM3 (ref 140–450)
PMV BLD AUTO: 9.6 FL (ref 6–12)
POTASSIUM SERPL-SCNC: 3.3 MMOL/L (ref 3.5–5.2)
PROT SERPL-MCNC: 7.5 G/DL (ref 6–8.5)
RBC # BLD AUTO: 4 10*6/MM3 (ref 3.77–5.28)
RH BLD: POSITIVE
SODIUM SERPL-SCNC: 137 MMOL/L (ref 136–145)
T&S EXPIRATION DATE: NORMAL
TIBC SERPL-MCNC: 568 MCG/DL (ref 298–536)
TRANSFERRIN SERPL-MCNC: 381 MG/DL (ref 200–360)
WBC NRBC COR # BLD: 4.89 10*3/MM3 (ref 3.4–10.8)
WHOLE BLOOD HOLD SPECIMEN: NORMAL
WHOLE BLOOD HOLD SPECIMEN: NORMAL

## 2022-01-07 PROCEDURE — 84702 CHORIONIC GONADOTROPIN TEST: CPT | Performed by: PHYSICIAN ASSISTANT

## 2022-01-07 PROCEDURE — 93005 ELECTROCARDIOGRAM TRACING: CPT | Performed by: PHYSICIAN ASSISTANT

## 2022-01-07 PROCEDURE — 76830 TRANSVAGINAL US NON-OB: CPT

## 2022-01-07 PROCEDURE — 80053 COMPREHEN METABOLIC PANEL: CPT | Performed by: EMERGENCY MEDICINE

## 2022-01-07 PROCEDURE — 85025 COMPLETE CBC W/AUTO DIFF WBC: CPT | Performed by: EMERGENCY MEDICINE

## 2022-01-07 PROCEDURE — 99285 EMERGENCY DEPT VISIT HI MDM: CPT

## 2022-01-07 PROCEDURE — 81025 URINE PREGNANCY TEST: CPT | Performed by: EMERGENCY MEDICINE

## 2022-01-07 PROCEDURE — 86901 BLOOD TYPING SEROLOGIC RH(D): CPT | Performed by: EMERGENCY MEDICINE

## 2022-01-07 PROCEDURE — 84466 ASSAY OF TRANSFERRIN: CPT | Performed by: EMERGENCY MEDICINE

## 2022-01-07 PROCEDURE — 86850 RBC ANTIBODY SCREEN: CPT | Performed by: EMERGENCY MEDICINE

## 2022-01-07 PROCEDURE — 86900 BLOOD TYPING SEROLOGIC ABO: CPT | Performed by: EMERGENCY MEDICINE

## 2022-01-07 PROCEDURE — 83540 ASSAY OF IRON: CPT | Performed by: EMERGENCY MEDICINE

## 2022-01-07 RX ORDER — MEDROXYPROGESTERONE ACETATE 10 MG/1
10 TABLET ORAL ONCE
Status: COMPLETED | OUTPATIENT
Start: 2022-01-07 | End: 2022-01-07

## 2022-01-07 RX ORDER — SODIUM CHLORIDE 0.9 % (FLUSH) 0.9 %
10 SYRINGE (ML) INJECTION AS NEEDED
Status: DISCONTINUED | OUTPATIENT
Start: 2022-01-07 | End: 2022-01-08 | Stop reason: HOSPADM

## 2022-01-07 RX ORDER — FERROUS SULFATE 325(65) MG
325 TABLET ORAL ONCE
Status: COMPLETED | OUTPATIENT
Start: 2022-01-07 | End: 2022-01-07

## 2022-01-07 RX ORDER — DOXYCYCLINE HYCLATE 50 MG/1
324 CAPSULE, GELATIN COATED ORAL 2 TIMES DAILY WITH MEALS
Qty: 60 TABLET | Refills: 0 | Status: SHIPPED | OUTPATIENT
Start: 2022-01-07

## 2022-01-07 RX ORDER — MEDROXYPROGESTERONE ACETATE 10 MG/1
10 TABLET ORAL DAILY
Qty: 10 TABLET | Refills: 0 | Status: SHIPPED | OUTPATIENT
Start: 2022-01-07

## 2022-01-07 RX ADMIN — FERROUS SULFATE TAB 325 MG (65 MG ELEMENTAL FE) 325 MG: 325 (65 FE) TAB at 23:23

## 2022-01-07 RX ADMIN — SODIUM CHLORIDE 1000 ML: 9 INJECTION, SOLUTION INTRAVENOUS at 21:12

## 2022-01-07 RX ADMIN — MEDROXYPROGESTERONE ACETATE 10 MG: 10 TABLET ORAL at 23:23

## 2022-01-08 VITALS
HEART RATE: 83 BPM | HEIGHT: 66 IN | DIASTOLIC BLOOD PRESSURE: 86 MMHG | WEIGHT: 160 LBS | RESPIRATION RATE: 16 BRPM | OXYGEN SATURATION: 100 % | SYSTOLIC BLOOD PRESSURE: 136 MMHG | BODY MASS INDEX: 25.71 KG/M2 | TEMPERATURE: 99.4 F

## 2022-01-08 LAB
HOLD SPECIMEN: NORMAL
QT INTERVAL: 386 MS
QTC INTERVAL: 425 MS

## 2022-01-08 NOTE — ED PROVIDER NOTES
Subjective   This is a 36-year-old female that presents the ER with irregular vaginal bleeding. She had normal menses on 12/15/2021. She started having increased bleeding 2 days ago but bleeding became much heavier today. Patient says that she has been through at least 2 pads per hour and a total of 10 pads since she woke up this morning. She reports mild mid suprapubic cramping. She reports some dizziness with standing and near syncope. She denies history of irregular vaginal bleeding between menses. Patient denies any significant past medical history other than bilateral tubal ligation. She follows with gynecologist, Dylon Barron, [place], [place]. Patient tried to increase fluids today. She denies any shortness of breath or syncope. She denies any dysuria, urgency, or frequency. Patient denies any personal history of bleeding disorder. Patient denies any frequent NSAID use. No other concerns at this time.       History provided by:  Patient  Vaginal Bleeding  Quality:  Bright red  Duration:  2 days  Timing:  Constant  Progression:  Unchanged  Menstrual history:  Regular  Number of pads used:  10  Possible pregnancy: no    Context: spontaneously    Context comment:  Patient started having vaginal bleeding 2 days ago. LMP: 12/15/2021. This vaginal bleeding is early. No recent sexual intercourse. No history of menorrhagia. No blood thinners. Mild mid suprapubic cramping  Relieved by:  Nothing  Worsened by:  Nothing  Ineffective treatments:  None tried  Associated symptoms: abdominal pain (Mild mid suprapubic abdominal cramping. No right or left suprapubic pain) and dizziness (Dizziness with standing)    Associated symptoms: no back pain, no dysuria, no fatigue, no fever, no nausea and no vaginal discharge    Risk factors: no bleeding disorder, no hx of ectopic pregnancy, no hx of endometriosis, no new sexual partner and no ovarian torsion        Review of Systems   Constitutional: Negative.  Negative for  appetite change, chills, fatigue and fever.   HENT: Negative.    Respiratory: Negative.  Negative for shortness of breath.    Cardiovascular: Negative.  Negative for chest pain.   Gastrointestinal: Positive for abdominal pain (Mild mid suprapubic abdominal cramping. No right or left suprapubic pain). Negative for blood in stool, constipation, diarrhea and nausea.   Genitourinary: Positive for vaginal bleeding. Negative for dysuria, flank pain, frequency, urgency and vaginal discharge.        LMP: 12/15/21.  New onset vaginal bleeding that started 2 days ago.   Musculoskeletal: Negative for back pain.   Skin: Negative.  Negative for color change.        No evidence of bleeding or bruising on the skin   Neurological: Positive for dizziness (Dizziness with standing). Negative for syncope and weakness.        Episode of near syncope x2 throughout the day. No syncopal episodes.   Hematological: Negative.  Does not bruise/bleed easily.   All other systems reviewed and are negative.      No past medical history on file.    No Known Allergies    Past Surgical History:   Procedure Laterality Date   • FOOT OSTEOTOMY     • FOOT OSTEOTOMY Left 6/26/2018    Procedure: REVISION OF OSTEOTOMY LEFT FOOT;  Surgeon: Albina Banuelos MD;  Location: [facility];  Service: Orthopedics       Family History   Problem Relation Age of Onset   • Stroke Mother    • Arthritis Father        Social History     Socioeconomic History   • Marital status:    Tobacco Use   • Smoking status: Former Smoker     Types: Cigarettes   • Smokeless tobacco: Never Used   Substance and Sexual Activity   • Alcohol use: Yes     Alcohol/week: 1.0 standard drink     Types: 1 Glasses of wine per week   • Drug use: No   • Sexual activity: Defer           Objective   Physical Exam  Vitals and nursing note reviewed.   Constitutional:       Appearance: Normal appearance.   HENT:      Head: Normocephalic and atraumatic.      Right Ear: Tympanic membrane normal.       Left Ear: Tympanic membrane normal.      Nose: Nose normal.      Mouth/Throat:      Mouth: Mucous membranes are moist.      Pharynx: Oropharynx is clear.   Eyes:      Extraocular Movements: Extraocular movements intact.      Conjunctiva/sclera: Conjunctivae normal.      Pupils: Pupils are equal, round, and reactive to light.   Cardiovascular:      Rate and Rhythm: Normal rate and regular rhythm.  No extrasystoles are present.     Pulses: Normal pulses.      Heart sounds: Normal heart sounds.      Comments: No tachycardia or ectopy.  Pulmonary:      Effort: Pulmonary effort is normal. No tachypnea.      Breath sounds: Normal breath sounds.      Comments: Lungs are clear to auscultation bilaterally.  Abdominal:      General: Bowel sounds are normal. There is no distension.      Palpations: Abdomen is soft.      Tenderness: There is no abdominal tenderness. There is no right CVA tenderness, left CVA tenderness, guarding or rebound.      Comments: Abdomen soft and nontender. No flank or CVA tenderness.   Genitourinary:     Vagina: Bleeding present.      Cervix: No cervical motion tenderness or friability.      Uterus: Normal. Not tender.       Adnexa: Right adnexa normal and left adnexa normal.        Right: No tenderness.          Left: No tenderness.        Comments: There is small amount of blood in the vaginal vault. No clots or tissue. External os is closed and cervix appears within normal limits. Bimanual exam is within normal limits. No uterine tenderness or adnexal tenderness. No mass palpable. Bleeding is stable.  Musculoskeletal:         General: Normal range of motion.      Cervical back: Normal range of motion and neck supple.      Right lower leg: No edema.      Left lower leg: No edema.   Skin:     General: Skin is warm and dry.      Coloration: Skin is not pale.      Findings: No bruising.      Comments: No pallor noted on exam. No evidence of increased bleeding or bruising to the skin.   Neurological:       General: No focal deficit present.      Mental Status: She is alert.      Cranial Nerves: Cranial nerves are intact.      Sensory: Sensation is intact.      Motor: Motor function is intact.      Coordination: Coordination is intact.      Comments: Neuro intact and nonfocal.   Psychiatric:         Attention and Perception: Attention normal.         Mood and Affect: Mood normal.         Speech: Speech normal.         Behavior: Behavior normal. Behavior is cooperative.         Thought Content: Thought content normal.         Cognition and Memory: Cognition normal.         Procedures           ED Course  ED Course as of 01/07/22 2359 Fri Jan 07, 2022 2139 Hemoglobin(!): 9.5 [RS]   2350 CBC revealed H&H of 9.5 and 30.2. Platelet count is normal at 315. Last hemoglobin and hematocrit was from 3 years ago and H&H was normal at 13 and 39. Iron studies revealed normal serum iron at 208, transferrin 381 and TIBC 568. Urine hCG and quant hCG are negative. Chemistries were within normal limits. Orthostatic blood pressures revealed lying blood pressure 136/83 with heart rate 72, sitting blood pressure was 133/83 with heart rate 85, standing blood pressure was 126/81 with heart rate 83 and patient was dizzy. Patient had no significant orthostatic hypotension. Patient was given IV fluid bolus. Pelvic exam revealed stable vaginal bleeding. Discussed the case with on-call gynecologist, Dr. Rocha. She felt like patient could be followed up closely with her routine gynecologist, Dr. Barron, in [place], [place]. She said that we could either do birth control or Provera 10 mg by mouth daily for 10 days on discharge. I discussed these options with patient and she would rather do Provera short-term then start birth control. Patient is a non-smoker. Recommend increase fluids and complete pelvic rest. Recommend close repeat H&H with PCP on Monday or return sooner to the ER over the weekend if bleeding remains heavy and patient  has symptomatic anemia. We also will initiate ferrous sulfate 325 mg by mouth twice daily on discharge. Patient is agreeable with above treatment plan. [FC]      ED Course User Index  [FC] Sommer Multani PA-C  [RS] Shawn Loaiza MD           Recent Results (from the past 24 hour(s))   Green Top (Gel)    Collection Time: 01/07/22  8:14 PM   Result Value Ref Range    Extra Tube Hold for add-ons.    Lavender Top    Collection Time: 01/07/22  8:14 PM   Result Value Ref Range    Extra Tube hold for add-on    Gold Top - SST    Collection Time: 01/07/22  8:14 PM   Result Value Ref Range    Extra Tube Hold for add-ons.    Light Blue Top    Collection Time: 01/07/22  8:14 PM   Result Value Ref Range    Extra Tube hold for add-on    CBC Auto Differential    Collection Time: 01/07/22  8:14 PM    Specimen: Blood   Result Value Ref Range    WBC 4.89 3.40 - 10.80 10*3/mm3    RBC 4.00 3.77 - 5.28 10*6/mm3    Hemoglobin 9.5 (L) 12.0 - 15.9 g/dL    Hematocrit 30.2 (L) 34.0 - 46.6 %    MCV 75.5 (L) 79.0 - 97.0 fL    MCH 23.8 (L) 26.6 - 33.0 pg    MCHC 31.5 31.5 - 35.7 g/dL    RDW 16.7 (H) 12.3 - 15.4 %    RDW-SD 45.4 37.0 - 54.0 fl    MPV 9.6 6.0 - 12.0 fL    Platelets 315 140 - 450 10*3/mm3    Neutrophil % 67.4 42.7 - 76.0 %    Lymphocyte % 16.8 (L) 19.6 - 45.3 %    Monocyte % 8.0 5.0 - 12.0 %    Eosinophil % 7.2 (H) 0.3 - 6.2 %    Basophil % 0.4 0.0 - 1.5 %    Immature Grans % 0.2 0.0 - 0.5 %    Neutrophils, Absolute 3.30 1.70 - 7.00 10*3/mm3    Lymphocytes, Absolute 0.82 0.70 - 3.10 10*3/mm3    Monocytes, Absolute 0.39 0.10 - 0.90 10*3/mm3    Eosinophils, Absolute 0.35 0.00 - 0.40 10*3/mm3    Basophils, Absolute 0.02 0.00 - 0.20 10*3/mm3    Immature Grans, Absolute 0.01 0.00 - 0.05 10*3/mm3    nRBC 0.0 0.0 - 0.2 /100 WBC   hCG, Quantitative, Pregnancy    Collection Time: 01/07/22  8:14 PM    Specimen: Blood   Result Value Ref Range    HCG Quantitative <0.10 mIU/mL   Comprehensive Metabolic Panel    Collection Time:  01/07/22  8:14 PM    Specimen: Blood   Result Value Ref Range    Glucose 100 (H) 65 - 99 mg/dL    BUN 12 6 - 20 mg/dL    Creatinine 0.63 0.57 - 1.00 mg/dL    Sodium 137 136 - 145 mmol/L    Potassium 3.3 (L) 3.5 - 5.2 mmol/L    Chloride 101 98 - 107 mmol/L    CO2 25.0 22.0 - 29.0 mmol/L    Calcium 8.6 8.6 - 10.5 mg/dL    Total Protein 7.5 6.0 - 8.5 g/dL    Albumin 4.10 3.50 - 5.20 g/dL    ALT (SGPT) 25 1 - 33 U/L    AST (SGOT) 22 1 - 32 U/L    Alkaline Phosphatase 79 39 - 117 U/L    Total Bilirubin <0.2 0.0 - 1.2 mg/dL    eGFR Non African Amer 106 >60 mL/min/1.73    Globulin 3.4 gm/dL    A/G Ratio 1.2 g/dL    BUN/Creatinine Ratio 19.0 7.0 - 25.0    Anion Gap 11.0 5.0 - 15.0 mmol/L   Iron Profile    Collection Time: 01/07/22  8:14 PM    Specimen: Blood   Result Value Ref Range    Iron 208 (H) 37 - 145 mcg/dL    Iron Saturation 37 20 - 50 %    Transferrin 381 (H) 200 - 360 mg/dL    TIBC 568 (H) 298 - 536 mcg/dL   POCT Urine Pregnancy    Collection Time: 01/07/22  8:42 PM    Specimen: Urine   Result Value Ref Range    HCG, Urine, QL Negative Negative    Lot Number [id number]     Internal Positive Control Positive Positive, Passed    Internal Negative Control Negative Negative, Passed    Expiration Date 3/31/23    Type & Screen    Collection Time: 01/07/22  9:45 PM    Specimen: Blood   Result Value Ref Range    ABO Type A     RH type Positive     Antibody Screen Negative     T&S Expiration Date 1/10/2022 11:59:59 PM      Note: In addition to lab results from this visit, the labs listed above may include labs taken at another facility or during a different encounter within the last 24 hours. Please correlate lab times with ED admission and discharge times for further clarification of the services performed during this visit.    US Non-ob Transvaginal   Final Result   Uterus and left ovary within normal limits for premenopausal patient. Left ovary not seen.      Signer Name: Soniya Banda MD    Signed: 1/7/2022 10:10 PM     Workstation Name: [id number]     [facility]        Vitals:    01/07/22 2245 01/07/22 2246 01/07/22 2247 01/07/22 2248   BP: 136/83 133/83  126/87   BP Location: Left arm Left arm  Left arm   Patient Position: Lying Sitting  Standing   Pulse: 72 85 88 83   Resp: 18 18  18   Temp:       TempSrc:       SpO2: 98% 98% 100% 100%   Weight:       Height:         Medications   sodium chloride 0.9 % flush 10 mL (has no administration in time range)   sodium chloride 0.9 % bolus 1,000 mL (1,000 mL Intravenous New Bag 1/7/22 2112)   ferrous sulfate tablet 325 mg (325 mg Oral Given 1/7/22 2323)   medroxyPROGESTERone (PROVERA) tablet 10 mg (10 mg Oral Given 1/7/22 2323)     ECG/EMG Results (last 24 hours)     ** No results found for the last 24 hours. **        ECG 12 Lead    (Results Pending)                                             MDM    Final diagnoses:   Menorrhagia with irregular cycle   Dysfunctional uterine bleeding   Dizziness on standing   Near syncope   Anemia, unspecified type       ED Disposition  ED Disposition     ED Disposition Condition Comment    Discharge Stable           Dylon Wright, DO  [address] [place]  [place] [id number] [phone]    Call in 2 days  Call Monday for first available [facility]    [facility] Emergency Department  [address] [place] [id number] [phone]    If symptoms worsen         Medication List      New Prescriptions    ferrous gluconate 324 MG tablet  Commonly known as: FERGON  Take 1 tablet by mouth 2 (Two) Times a Day With Meals.     medroxyPROGESTERone 10 MG tablet  Commonly known as: Provera  Take 1 tablet by mouth Daily.           Where to Get Your Medications      These medications were sent to [facility] [place] - [address]  [phone] [place] [phone]   [address] [place] [address]    Phone: [phone]   · ferrous gluconate 324 MG  tablet  · medroxyPROGESTERone 10 MG tablet          Sommer Multani PA-C  01/07/22 8280

## 2022-01-08 NOTE — DISCHARGE INSTRUCTIONS
ER evaluation reveals irregular vaginal bleeding. Patient is anemic with hemoglobin and hematocrit at 9.5 and 30. Discussed the case with Dr. Rocha, on-call gynecology. We will initiate Provera 10 mg by mouth daily x10 days. Also will prescribe ferrous gluconate twice daily. Increase fluids. Recommend patient to call her routine gynecologist, Dr. Barron Monday for first available recheck. Recommend patient to return to the ER if bleeding persists at greater than 2-3 pads per hour and patient continues to have dizziness and near syncope. Patient did not have orthostatic hypotension during the ER course.

## 2022-07-09 ENCOUNTER — APPOINTMENT (OUTPATIENT)
Dept: GENERAL RADIOLOGY | Facility: HOSPITAL | Age: 37
End: 2022-07-09

## 2022-07-09 LAB
ALBUMIN SERPL-MCNC: 4.5 G/DL (ref 3.5–5.2)
ALBUMIN/GLOB SERPL: 1.5 G/DL
ALP SERPL-CCNC: 63 U/L (ref 39–117)
ALT SERPL W P-5'-P-CCNC: 50 U/L (ref 1–33)
ANION GAP SERPL CALCULATED.3IONS-SCNC: 12 MMOL/L (ref 5–15)
AST SERPL-CCNC: 25 U/L (ref 1–32)
BASOPHILS # BLD AUTO: 0.03 10*3/MM3 (ref 0–0.2)
BASOPHILS NFR BLD AUTO: 0.5 % (ref 0–1.5)
BILIRUB SERPL-MCNC: 0.3 MG/DL (ref 0–1.2)
BUN SERPL-MCNC: 15 MG/DL (ref 6–20)
BUN/CREAT SERPL: 19.7 (ref 7–25)
CALCIUM SPEC-SCNC: 9.4 MG/DL (ref 8.6–10.5)
CHLORIDE SERPL-SCNC: 102 MMOL/L (ref 98–107)
CO2 SERPL-SCNC: 25 MMOL/L (ref 22–29)
CREAT SERPL-MCNC: 0.76 MG/DL (ref 0.57–1)
DEPRECATED RDW RBC AUTO: 40.9 FL (ref 37–54)
EGFRCR SERPLBLD CKD-EPI 2021: 103 ML/MIN/1.73
EOSINOPHIL # BLD AUTO: 0.24 10*3/MM3 (ref 0–0.4)
EOSINOPHIL NFR BLD AUTO: 3.7 % (ref 0.3–6.2)
ERYTHROCYTE [DISTWIDTH] IN BLOOD BY AUTOMATED COUNT: 12.6 % (ref 12.3–15.4)
GLOBULIN UR ELPH-MCNC: 3 GM/DL
GLUCOSE SERPL-MCNC: 131 MG/DL (ref 65–99)
HCT VFR BLD AUTO: 39 % (ref 34–46.6)
HGB BLD-MCNC: 13.6 G/DL (ref 12–15.9)
HOLD SPECIMEN: NORMAL
HOLD SPECIMEN: NORMAL
IMM GRANULOCYTES # BLD AUTO: 0.02 10*3/MM3 (ref 0–0.05)
IMM GRANULOCYTES NFR BLD AUTO: 0.3 % (ref 0–0.5)
LYMPHOCYTES # BLD AUTO: 1.97 10*3/MM3 (ref 0.7–3.1)
LYMPHOCYTES NFR BLD AUTO: 30.3 % (ref 19.6–45.3)
MAGNESIUM SERPL-MCNC: 2.3 MG/DL (ref 1.6–2.6)
MCH RBC QN AUTO: 30.8 PG (ref 26.6–33)
MCHC RBC AUTO-ENTMCNC: 34.9 G/DL (ref 31.5–35.7)
MCV RBC AUTO: 88.4 FL (ref 79–97)
MONOCYTES # BLD AUTO: 0.29 10*3/MM3 (ref 0.1–0.9)
MONOCYTES NFR BLD AUTO: 4.5 % (ref 5–12)
NEUTROPHILS NFR BLD AUTO: 3.96 10*3/MM3 (ref 1.7–7)
NEUTROPHILS NFR BLD AUTO: 60.7 % (ref 42.7–76)
NRBC BLD AUTO-RTO: 0 /100 WBC (ref 0–0.2)
PLATELET # BLD AUTO: 334 10*3/MM3 (ref 140–450)
PMV BLD AUTO: 9.4 FL (ref 6–12)
POTASSIUM SERPL-SCNC: 3.8 MMOL/L (ref 3.5–5.2)
PROT SERPL-MCNC: 7.5 G/DL (ref 6–8.5)
RBC # BLD AUTO: 4.41 10*6/MM3 (ref 3.77–5.28)
SODIUM SERPL-SCNC: 139 MMOL/L (ref 136–145)
TROPONIN T SERPL-MCNC: <0.01 NG/ML (ref 0–0.03)
WBC NRBC COR # BLD: 6.51 10*3/MM3 (ref 3.4–10.8)
WHOLE BLOOD HOLD COAG: NORMAL
WHOLE BLOOD HOLD SPECIMEN: NORMAL

## 2022-07-09 PROCEDURE — 71045 X-RAY EXAM CHEST 1 VIEW: CPT

## 2022-07-09 PROCEDURE — 83735 ASSAY OF MAGNESIUM: CPT

## 2022-07-09 PROCEDURE — 99283 EMERGENCY DEPT VISIT LOW MDM: CPT

## 2022-07-09 PROCEDURE — 85025 COMPLETE CBC W/AUTO DIFF WBC: CPT

## 2022-07-09 PROCEDURE — 93005 ELECTROCARDIOGRAM TRACING: CPT | Performed by: EMERGENCY MEDICINE

## 2022-07-09 PROCEDURE — 84484 ASSAY OF TROPONIN QUANT: CPT

## 2022-07-09 PROCEDURE — 80053 COMPREHEN METABOLIC PANEL: CPT

## 2022-07-09 RX ORDER — SODIUM CHLORIDE 0.9 % (FLUSH) 0.9 %
10 SYRINGE (ML) INJECTION AS NEEDED
Status: DISCONTINUED | OUTPATIENT
Start: 2022-07-09 | End: 2022-07-10 | Stop reason: HOSPADM

## 2022-07-10 ENCOUNTER — APPOINTMENT (OUTPATIENT)
Dept: CT IMAGING | Facility: HOSPITAL | Age: 37
End: 2022-07-10

## 2022-07-10 ENCOUNTER — HOSPITAL ENCOUNTER (EMERGENCY)
Facility: HOSPITAL | Age: 37
Discharge: HOME | End: 2022-07-10
Attending: EMERGENCY MEDICINE | Admitting: EMERGENCY MEDICINE

## 2022-07-10 VITALS
DIASTOLIC BLOOD PRESSURE: 93 MMHG | HEIGHT: 66 IN | BODY MASS INDEX: 22.5 KG/M2 | SYSTOLIC BLOOD PRESSURE: 130 MMHG | OXYGEN SATURATION: 99 % | TEMPERATURE: 98.4 F | WEIGHT: 140 LBS | RESPIRATION RATE: 16 BRPM | HEART RATE: 64 BPM

## 2022-07-10 DIAGNOSIS — R42 DIZZINESS: Primary | ICD-10-CM

## 2022-07-10 DIAGNOSIS — R55 NEAR SYNCOPE: ICD-10-CM

## 2022-07-10 DIAGNOSIS — R42 LIGHTHEADEDNESS: ICD-10-CM

## 2022-07-10 LAB
BACTERIA UR QL AUTO: ABNORMAL /HPF
BILIRUB UR QL STRIP: NEGATIVE
CLARITY UR: CLEAR
COLOR UR: YELLOW
GLUCOSE UR STRIP-MCNC: NEGATIVE MG/DL
HGB UR QL STRIP.AUTO: ABNORMAL
HOLD SPECIMEN: NORMAL
HYALINE CASTS UR QL AUTO: ABNORMAL /LPF
KETONES UR QL STRIP: NEGATIVE
LEUKOCYTE ESTERASE UR QL STRIP.AUTO: NEGATIVE
NITRITE UR QL STRIP: NEGATIVE
PH UR STRIP.AUTO: 5.5 [PH] (ref 5–8)
PROT UR QL STRIP: NEGATIVE
QT INTERVAL: 378 MS
QTC INTERVAL: 410 MS
RBC # UR STRIP: ABNORMAL /HPF
REF LAB TEST METHOD: ABNORMAL
SP GR UR STRIP: 1.03 (ref 1–1.03)
SQUAMOUS #/AREA URNS HPF: ABNORMAL /HPF
UROBILINOGEN UR QL STRIP: ABNORMAL
WBC # UR STRIP: ABNORMAL /HPF

## 2022-07-10 PROCEDURE — 81001 URINALYSIS AUTO W/SCOPE: CPT | Performed by: EMERGENCY MEDICINE

## 2022-07-10 PROCEDURE — 70450 CT HEAD/BRAIN W/O DYE: CPT

## 2022-07-10 PROCEDURE — 96374 THER/PROPH/DIAG INJ IV PUSH: CPT

## 2022-07-10 PROCEDURE — 25010000002 DEXAMETHASONE SODIUM PHOSPHATE 100 MG/10ML SOLUTION: Performed by: EMERGENCY MEDICINE

## 2022-07-10 RX ORDER — METHYLPREDNISOLONE 4 MG/1
TABLET ORAL
Qty: 1 EACH | Refills: 0 | Status: SHIPPED | OUTPATIENT
Start: 2022-07-10

## 2022-07-10 RX ORDER — MECLIZINE HYDROCHLORIDE 25 MG/1
25 TABLET ORAL 3 TIMES DAILY PRN
Qty: 15 TABLET | Refills: 0 | Status: SHIPPED | OUTPATIENT
Start: 2022-07-10

## 2022-07-10 RX ORDER — MECLIZINE HYDROCHLORIDE 25 MG/1
25 TABLET ORAL ONCE
Status: COMPLETED | OUTPATIENT
Start: 2022-07-10 | End: 2022-07-10

## 2022-07-10 RX ORDER — METOCLOPRAMIDE HYDROCHLORIDE 5 MG/ML
10 INJECTION INTRAMUSCULAR; INTRAVENOUS ONCE
Status: DISCONTINUED | OUTPATIENT
Start: 2022-07-10 | End: 2022-07-10 | Stop reason: HOSPADM

## 2022-07-10 RX ORDER — NAPROXEN 500 MG/1
500 TABLET ORAL 2 TIMES DAILY WITH MEALS
Qty: 20 TABLET | Refills: 0 | Status: SHIPPED | OUTPATIENT
Start: 2022-07-10

## 2022-07-10 RX ORDER — DIPHENHYDRAMINE HYDROCHLORIDE 50 MG/ML
25 INJECTION INTRAMUSCULAR; INTRAVENOUS ONCE
Status: DISCONTINUED | OUTPATIENT
Start: 2022-07-10 | End: 2022-07-10 | Stop reason: HOSPADM

## 2022-07-10 RX ADMIN — SODIUM CHLORIDE 1000 ML: 9 INJECTION, SOLUTION INTRAVENOUS at 00:42

## 2022-07-10 RX ADMIN — MECLIZINE HYDROCHLORIDE 25 MG: 25 TABLET ORAL at 02:07

## 2022-07-10 RX ADMIN — DEXAMETHASONE SODIUM PHOSPHATE 10 MG: 10 INJECTION, SOLUTION INTRAMUSCULAR; INTRAVENOUS at 02:08

## 2022-07-10 NOTE — ED PROVIDER NOTES
[place]    EMERGENCY DEPARTMENT ENCOUNTER      Pt Name: Mayela D ArreolaReyes  MRN: [id number]  YOB: 1985  Date of evaluation: 7/9/2022  Provider: Navin Fleming DO    CHIEF COMPLAINT       Chief Complaint   Patient presents with   • Dizziness                HISTORY OF PRESENT ILLNESS  (Location/Symptom, Timing/Onset, Context/Setting, Quality, Duration, Modifying Factors, Severity.)   Mayela D ArreolaReyes is a 36 y.o. female who presents to the emergency department for evaluation dizziness, lightheadedness which is positional nature, worsening throughout the evening since about 10:00 this morning.  History provided by the patient by her significant other who is translating for Cym[place].  The patient denies any falls, injury or head trauma.  She notes with any type of positions or movement she gets lightheaded, dizzy, for that she is about to pass out.  She notes walking on the stairs she got very lightheaded, dizzy, felt she was about to pass out.  She does not have any full syncopal episodes.  Denies any chest pain or palpitations associated with the incident.  She denies recent illness, no fevers or chills, denies any nausea, vomiting, diarrhea.  The patient states no prior history of any other neurological issues or neuroimaging in the past.  She denies any vision changes, no headache.  She does note some intermittent lightheaded and dizziness with movements.  She denies any lateral weakness, numbness or tingling in her upper or lower extremities.  The patient denies any recent illness, no known sick contacts.  She denies any other acute systemic complaints at this time.      Nursing notes were reviewed.    REVIEW OF SYSTEMS    (2-9 systems for level 4, 10 or more for level 5)   ROS:  General:  No fevers, no chills, no weakness  Cardiovascular:  No chest pain, no palpitations  Respiratory:  No shortness of breath, no cough, no wheezing  Gastrointestinal:  No pain, no nausea, no vomiting, no  diarrhea  Musculoskeletal:  No muscle pain, no joint pain  Skin:  No rash  Neurologic: Positive dizziness, lightheadedness, no speech problems, no headache, no extremity numbness, no extremity tingling, no extremity weakness  Psychiatric:  No anxiety  Genitourinary:  No dysuria, no hematuria    Except as noted above the remainder of the review of systems was reviewed and negative.       PAST MEDICAL HISTORY   No past medical history on file.      SURGICAL HISTORY       Past Surgical History:   Procedure Laterality Date   • FOOT OSTEOTOMY     • FOOT OSTEOTOMY Left 6/26/2018    Procedure: REVISION OF OSTEOTOMY LEFT FOOT;  Surgeon: Albina Banuelos MD;  [facility];  Service: Orthopedics         CURRENT MEDICATIONS       Current Facility-Administered Medications:   •  dexamethasone (DECADRON) IVPB 10 mg, 10 mg, Intravenous, Once, Navin Fleming DO  •  diphenhydrAMINE (BENADRYL) injection 25 mg, 25 mg, Intravenous, Once, Navin Fleming DO  •  meclizine (ANTIVERT) tablet 25 mg, 25 mg, Oral, Once, Navin Fleming DO  •  metoclopramide (REGLAN) injection 10 mg, 10 mg, Intravenous, Once, Navin Fleming DO  •  sodium chloride 0.9 % flush 10 mL, 10 mL, Intravenous, PRN, Navin Fleming DO    Current Outpatient Medications:   •  ferrous gluconate (FERGON) 324 MG tablet, Take 1 tablet by mouth 2 (Two) Times a Day With Meals., Disp: 60 tablet, Rfl: 0  •  meclizine (ANTIVERT) 25 MG tablet, Take 1 tablet by mouth 3 (Three) Times a Day As Needed for Dizziness., Disp: 15 tablet, Rfl: 0  •  medroxyPROGESTERone (Provera) 10 MG tablet, Take 1 tablet by mouth Daily., Disp: 10 tablet, Rfl: 0  •  methylPREDNISolone (MEDROL) 4 MG dose pack, Take as directed on package instructions., Disp: 1 each, Rfl: 0  •  naproxen (Naprosyn) 500 MG tablet, Take 1 tablet by mouth 2 (Two) Times a Day With Meals., Disp: 20 tablet, Rfl: 0    ALLERGIES     Patient has no known allergies.    FAMILY HISTORY    "    Family History   Problem Relation Age of Onset   • Stroke Mother    • Arthritis Father           SOCIAL HISTORY       Social History     Socioeconomic History   • Marital status:    Tobacco Use   • Smoking status: Former Smoker     Types: Cigarettes   • Smokeless tobacco: Never Used   Substance and Sexual Activity   • Alcohol use: Yes     Alcohol/week: 1.0 standard drink     Types: 1 Glasses of wine per week   • Drug use: No   • Sexual activity: Defer         PHYSICAL EXAM    (up to 7 for level 4, 8 or more for level 5)     Vitals:    07/09/22 2010   BP: 142/94   BP Location: Left arm   Patient Position: Sitting   Pulse: 69   Resp: 16   Temp: 98.4 °F (36.9 °C)   TempSrc: Oral   SpO2: 100%   Weight: 63.5 kg (140 lb)   Height: 167.6 cm (66\")       Physical Exam  General : Patient is awake, alert, oriented, in no acute distress, nontoxic appearing  HEENT: Pupils are equally round, EOMI, conjunctivae clear, there is no injection no icterus.  Oral mucosa is moist, uvula midline .  Tympanic membranes are n without acute infectious etiology, no signs of significant erythema, there is clear fluid, slight bulging tympanic membranes bilaterally without any active bleeding or drainage.  Nontender to palpation    Neck: Neck is supple, full range of motion, trachea midline, no meningeal signs  Cardiac: Heart regular rate, rhythm, no murmurs, rubs, or gallops  Lungs: Lungs are clear to auscultation, there is no wheezing, rhonchi, or rales. There is no use of accessory muscles  Chest wall: There is no tenderness to palpation over the chest wall or over ribs  Abdomen: Abdomen is soft, nontender, nondistended. There are no firm or pulsatile masses, no rebound rigidity or guarding  Musculoskeletal: 5 out of 5 strength in all 4 extremities.  No focal muscle deficits are appreciated  Neuro: Motor intact, sensory intact, level of consciousness is normal, cerebellar function is normal, no focal neurological deficit " examination.  Patient with 5 out of 5 strength bilateral upper and lower extremities, no unilateral weakness, cranial nerves are intact.  Dermatology: Skin is warm and dry  Psych: Mentation is grossly normal, cognition is grossly normal. Affect is appropriate      DIAGNOSTIC RESULTS     EKG: All EKGs are interpreted by the Emergency Department Physician who either signs or Co-signs this chart in the absence of a cardiologist.    ECG 12 Lead   Final Result   Test Reason : Weak/Dizzy/AMS protocol   Blood Pressure :   */*   mmHG   Vent. Rate :  71 BPM     Atrial Rate :  71 BPM      P-R Int : 146 ms          QRS Dur :  66 ms       QT Int : 378 ms       P-R-T Axes :  43  -2  33 degrees      QTc Int : 410 ms      Normal sinus rhythm   Minimal voltage criteria for LVH, may be normal variant ( R in aVL )   Cannot rule out Anterior infarct , age undetermined   Abnormal ECG   When compared with ECG of 08-JAN-2022 00:06,   No significant change was found   Confirmed by MAGDA SCOTT MD ([id number]) on 7/10/2022 12:24:51 AM      Referred By:            Confirmed By: MAGDA SCOTT MD          RADIOLOGY:   Non-plain film images such as CT, Ultrasound and MRI are read by the radiologist. Plain radiographic images are visualized and preliminarily interpreted by the emergency physician with the below findings:      [] Radiologist's Report Reviewed:  CT Head Without Contrast   Final Result   No acute large territorial infarct, acute intracranial hemorrhage, or intracranial mass identified.      Electronically signed by:  Maurilio Cabrales     7/9/2022 10:51 PM Mountain Time      XR Chest 1 View   Final Result   Impression:   No evidence of an acute pleural or pulmonary parenchymal abnormality.      Electronically signed by:  David Vega M.D.     7/9/2022 8:52 PM Mountain Time            ED BEDSIDE ULTRASOUND:   Performed by ED Physician - none    LABS:    I have reviewed and interpreted all of the currently available lab results from  this visit (if applicable):  Results for orders placed or performed during the hospital encounter of 07/10/22   Comprehensive Metabolic Panel    Specimen: Blood   Result Value Ref Range    Glucose 131 (H) 65 - 99 mg/dL    BUN 15 6 - 20 mg/dL    Creatinine 0.76 0.57 - 1.00 mg/dL    Sodium 139 136 - 145 mmol/L    Potassium 3.8 3.5 - 5.2 mmol/L    Chloride 102 98 - 107 mmol/L    CO2 25.0 22.0 - 29.0 mmol/L    Calcium 9.4 8.6 - 10.5 mg/dL    Total Protein 7.5 6.0 - 8.5 g/dL    Albumin 4.50 3.50 - 5.20 g/dL    ALT (SGPT) 50 (H) 1 - 33 U/L    AST (SGOT) 25 1 - 32 U/L    Alkaline Phosphatase 63 39 - 117 U/L    Total Bilirubin 0.3 0.0 - 1.2 mg/dL    Globulin 3.0 gm/dL    A/G Ratio 1.5 g/dL    BUN/Creatinine Ratio 19.7 7.0 - 25.0    Anion Gap 12.0 5.0 - 15.0 mmol/L    eGFR 103.0 >60.0 mL/min/1.73   Troponin    Specimen: Blood   Result Value Ref Range    Troponin T <0.010 0.000 - 0.030 ng/mL   Magnesium    Specimen: Blood   Result Value Ref Range    Magnesium 2.3 1.6 - 2.6 mg/dL   Urinalysis With Microscopic If Indicated (No Culture) - Urine, Clean Catch    Specimen: Urine, Clean Catch   Result Value Ref Range    Color, UA Yellow Yellow, Straw    Appearance, UA Clear Clear    pH, UA 5.5 5.0 - 8.0    Specific Gravity, UA 1.027 1.001 - 1.030    Glucose, UA Negative Negative    Ketones, UA Negative Negative    Bilirubin, UA Negative Negative    Blood, UA Small (1+) (A) Negative    Protein, UA Negative Negative    Leuk Esterase, UA Negative Negative    Nitrite, UA Negative Negative    Urobilinogen, UA 0.2 E.U./dL 0.2 - 1.0 E.U./dL   CBC Auto Differential    Specimen: Blood   Result Value Ref Range    WBC 6.51 3.40 - 10.80 10*3/mm3    RBC 4.41 3.77 - 5.28 10*6/mm3    Hemoglobin 13.6 12.0 - 15.9 g/dL    Hematocrit 39.0 34.0 - 46.6 %    MCV 88.4 79.0 - 97.0 fL    MCH 30.8 26.6 - 33.0 pg    MCHC 34.9 31.5 - 35.7 g/dL    RDW 12.6 12.3 - 15.4 %    RDW-SD 40.9 37.0 - 54.0 fl    MPV 9.4 6.0 - 12.0 fL    Platelets 334 140 - 450 10*3/mm3  "   Neutrophil % 60.7 42.7 - 76.0 %    Lymphocyte % 30.3 19.6 - 45.3 %    Monocyte % 4.5 (L) 5.0 - 12.0 %    Eosinophil % 3.7 0.3 - 6.2 %    Basophil % 0.5 0.0 - 1.5 %    Immature Grans % 0.3 0.0 - 0.5 %    Neutrophils, Absolute 3.96 1.70 - 7.00 10*3/mm3    Lymphocytes, Absolute 1.97 0.70 - 3.10 10*3/mm3    Monocytes, Absolute 0.29 0.10 - 0.90 10*3/mm3    Eosinophils, Absolute 0.24 0.00 - 0.40 10*3/mm3    Basophils, Absolute 0.03 0.00 - 0.20 10*3/mm3    Immature Grans, Absolute 0.02 0.00 - 0.05 10*3/mm3    nRBC 0.0 0.0 - 0.2 /100 WBC   Urinalysis, Microscopic Only - Urine, Clean Catch    Specimen: Urine, Clean Catch   Result Value Ref Range    RBC, UA 7-12 (A) None Seen, 0-2 /HPF    WBC, UA 0-2 None Seen, 0-2 /HPF    Bacteria, UA None Seen None Seen, Trace /HPF    Squamous Epithelial Cells, UA 0-2 None Seen, 0-2 /HPF    Hyaline Casts, UA 0-6 0 - 6 /LPF    Methodology Automated Microscopy    ECG 12 Lead   Result Value Ref Range    QT Interval 378 ms    QTC Interval 410 ms   Green Top (Gel)   Result Value Ref Range    Extra Tube Hold for add-ons.    Lavender Top   Result Value Ref Range    Extra Tube hold for add-on    Gold Top - SST   Result Value Ref Range    Extra Tube Hold for add-ons.    Gray Top   Result Value Ref Range    Extra Tube Hold for add-ons.    Light Blue Top   Result Value Ref Range    Extra Tube Hold for add-ons.         All other labs were within normal range or not returned as of this dictation.      EMERGENCY DEPARTMENT COURSE and DIFFERENTIAL DIAGNOSIS/MDM:   Vitals:    Vitals:    07/09/22 2010   BP: 142/94   BP Location: Left arm   Patient Position: Sitting   Pulse: 69   Resp: 16   Temp: 98.4 °F (36.9 °C)   TempSrc: Oral   SpO2: 100%   Weight: 63.5 kg (140 lb)   Height: 167.6 cm (66\")            Patient with intermittent dizziness, lightheadedness, near syncopal episode since about 10:00 this morning.  She notes it is very positional in nature.  She is nontoxic-appearing on my evaluation, she " does not have any meningeal signs on exam, her vital signs are stable, blood work and labs obtained with x-ray images which did not reveal any acute abnormality, electrolytes are stable, chest x-ray is with no acute findings.  Patient was given IV fluids, electrolyte replacement, symptomatic therapies for her dizziness and lightheadedness.  CT scan of the brain obtained.  Results as above.  No significant normalities appreciated on CT scan imaging.  I updated the patient and her significant other of these findings, we will proceed forward with anti-inflammatory medications.  She does not have any significant changes or signs of infection on her ear tabetic membrane examination, very mild slight fluid bulging but should be helped with the underlying steroids.  We will have her follow-up with her PCP for few days reevaluation, referral over to neurology as needed.    I had a discussion with the patient/family regarding diagnosis, diagnostic results, treatment plan, and medications.  The patient/family indicated understanding of these instructions.  I spent adequate time at the bedside preceding discharge necessary to personally discuss the aftercare instructions, giving patient education, providing explanations of the results of our evaluations/findings, and my decision making to assure that the patient/family understand the plan of care.  Time was allotted to answer questions at that time and throughout the ED course.  Emphasis was placed on timely follow-up after discharge.  I also discussed the potential for the development of an acute emergent condition requiring further evaluation, admission, or even surgical intervention. I discussed that we found nothing during the visit today indicating the need for further workup, admission, or the presence of an unstable medical condition.  I encouraged the patient to return to the emergency department immediately for ANY concerns, worsening, new complaints, or if symptoms  persist and unable to seek follow-up in a timely fashion.  The patient/family expressed understanding and agreement with this plan.  The patient will follow-up with their PCP in 1-2 days for reevaluation.       MEDICATIONS ADMINISTERED IN ED:  Medications   sodium chloride 0.9 % flush 10 mL (has no administration in time range)   dexamethasone (DECADRON) IVPB 10 mg (has no administration in time range)   metoclopramide (REGLAN) injection 10 mg (has no administration in time range)   diphenhydrAMINE (BENADRYL) injection 25 mg (has no administration in time range)   meclizine (ANTIVERT) tablet 25 mg (has no administration in time range)   sodium chloride 0.9 % bolus 1,000 mL (0 mL Intravenous Stopped 7/10/22 0206)       PROCEDURES:  Procedures    CRITICAL CARE TIME    Total Critical Care time was 0 minutes, excluding separately reportable procedures.   There was a high probability of clinically significant/life threatening deterioration in the patient's condition which required my urgent intervention.      FINAL IMPRESSION      1. Dizziness    2. Near syncope    3. Lightheadedness          DISPOSITION/PLAN     ED Disposition     ED Disposition   Discharge    Condition   Stable    Comment   --             PATIENT REFERRED TO:  Sarah Salmon PA  [address] [facility]  FIRST FLOOR  [facility] [id number] [phone]    In 2 days      Alphonso Mcduffie MD  [address]  [facility] [address] [phone]    Schedule an appointment as soon as possible for a visit   Neurologist for further work-up    [facility] Emergency Department  [address] [address] [phone]    If symptoms worsen      DISCHARGE MEDICATIONS:     Medication List      START taking these medications    meclizine 25 MG tablet  Commonly known as: ANTIVERT  Take 1 tablet by mouth 3 (Three) Times a Day As Needed for Dizziness.     methylPREDNISolone 4 MG dose pack  Commonly known as:  MEDROL  Take as directed on package instructions.     naproxen 500 MG tablet  Commonly known as: Naprosyn  Take 1 tablet by mouth 2 (Two) Times a Day With Meals.        CONTINUE taking these medications    ferrous gluconate 324 MG tablet  Commonly known as: FERGON  Take 1 tablet by mouth 2 (Two) Times a Day With Meals.     medroxyPROGESTERone 10 MG tablet  Commonly known as: Provera  Take 1 tablet by mouth Daily.           Where to Get Your Medications      These medications were sent to [facility] [address] [place] [address] AT [facility] [address] - [phone]  - [phone] [address] [address]    Phone: [phone]   · meclizine 25 MG tablet  · methylPREDNISolone 4 MG dose pack  · naproxen 500 MG tablet             Comment: Please note this report has been produced using speech recognition software.      Navin Fleming DO  Attending Emergency Physician               Navin Fleming,   07/10/22 0208       Navin Fleming,   07/10/22 0211

## (undated) DEVICE — SUCTION CANISTER, 2500CC, RIGID: Brand: DEROYAL

## (undated) DEVICE — SOL LR 1000ML

## (undated) DEVICE — AIRWY 90MM NO9

## (undated) DEVICE — SPLNT ORTHOGLASS UNPAD P/C 4X38IN

## (undated) DEVICE — PAD ARMBRD SURG CONVOL 7.5X20X2IN

## (undated) DEVICE — SUT MNCRYL 2/0 SH 27IN UD MCP417H

## (undated) DEVICE — BNDG ELAS ELITE V/CLOSE 4IN 5YD LF STRL

## (undated) DEVICE — APPL CHLORAPREP W/TINT 26ML BLU

## (undated) DEVICE — Device

## (undated) DEVICE — CVR HNDL LT SURG ACCSSRY BLU STRL

## (undated) DEVICE — SUT ETHLN 3/0 PC5 18IN 1893G

## (undated) DEVICE — PK EXTREM LOWR 10

## (undated) DEVICE — DRSNG TELFA PAD NONADH STR 1S 3X8IN

## (undated) DEVICE — UNDERCAST PADDING: Brand: DEROYAL

## (undated) DEVICE — GW NON THRD 1.25X150MM

## (undated) DEVICE — MEDI-VAC YANKAUER SUCTION HANDLE W/BULBOUS TIP: Brand: CARDINAL HEALTH

## (undated) DEVICE — SNAP KOVER: Brand: UNBRANDED

## (undated) DEVICE — 1010 S-DRAPE TOWEL DRAPE 10/BX: Brand: STERI-DRAPE™

## (undated) DEVICE — SPNG GZ STRL 2S 4X4 12PLY

## (undated) DEVICE — IMPLANTABLE DEVICE
Type: IMPLANTABLE DEVICE | Site: FOOT | Status: NON-FUNCTIONAL
Removed: 2018-06-26

## (undated) DEVICE — UNDERGLV SURG BIOGEL INDICAT PF 61/2 GRN

## (undated) DEVICE — MEDI-VAC NON-CONDUCTIVE SUCTION TUBING: Brand: CARDINAL HEALTH

## (undated) DEVICE — GAUZE,SPONGE,4"X4",16PLY,XRAY,STRL,LF: Brand: MEDLINE

## (undated) DEVICE — INTENDED FOR TISSUE SEPARATION, AND OTHER PROCEDURES THAT REQUIRE A SHARP SURGICAL BLADE TO PUNCTURE OR CUT.: Brand: BARD-PARKER ® SAFETYLOCK CARBON RIB-BACK BLADES

## (undated) DEVICE — SPNG GZ WOVN 4X4IN 12PLY 10/BX STRL

## (undated) DEVICE — SPLNT ORTHOGLASS UNPAD P/C 4X24IN

## (undated) DEVICE — SHEET,DRAPE,53X77,STERILE: Brand: MEDLINE

## (undated) DEVICE — 3M™ STERI-DRAPE™ INSTRUMENT POUCH 1018: Brand: STERI-DRAPE™

## (undated) DEVICE — GLV SURG SIGNATURE TOUCH PF LTX 7 STRL

## (undated) DEVICE — DELFI MATCHING LIMB PROTECTION SLEEVES (MLPS) HELP PROTECT THE PATIENT’S LIMB FROM POSSIBLE WRINKLING, PINCHING AND SHEARING OF SKIN AND SOFT TISSUES OF THE LIMB.EACH DELFI MATCHING LIMB PROTECTION SLEEVE IS INTENDED FOR USE WITH A SPECIFIC DELFI TOURNIQUET CUFF. THIS SLEEVE IS SPECIFICALLY FOR THIGH.: Brand: MATCHING LIMB PROTECTION SLEEVES - VARI-FIT